# Patient Record
Sex: FEMALE | Race: WHITE | Employment: STUDENT | URBAN - METROPOLITAN AREA
[De-identification: names, ages, dates, MRNs, and addresses within clinical notes are randomized per-mention and may not be internally consistent; named-entity substitution may affect disease eponyms.]

---
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
## 2018-01-31 ENCOUNTER — HOSPITAL ENCOUNTER (OUTPATIENT)
Dept: LAB | Age: 18
Discharge: HOME | End: 2018-01-31

## 2018-01-31 ENCOUNTER — HOSPITAL ENCOUNTER (EMERGENCY)
Age: 18
Discharge: HOME | End: 2018-01-31
Attending: FAMILY MEDICINE

## 2018-01-31 VITALS
WEIGHT: 192 LBS | HEIGHT: 62 IN | RESPIRATION RATE: 20 BRPM | TEMPERATURE: 98.1 F | BODY MASS INDEX: 35.33 KG/M2 | OXYGEN SATURATION: 97 % | HEART RATE: 76 BPM | DIASTOLIC BLOOD PRESSURE: 64 MMHG | SYSTOLIC BLOOD PRESSURE: 122 MMHG

## 2018-01-31 DIAGNOSIS — J10.1 INFLUENZA A: Primary | ICD-10-CM

## 2018-01-31 LAB
INFLUENZA A AG (POC): ABNORMAL
INFLUENZA AG (POC) NEGATIVE CTRL.: ABNORMAL
INFLUENZA AG (POC) POSITIVE CTRL.: ABNORMAL
INFLUENZA B AG (POC): ABNORMAL
LOT NUMBER POC: ABNORMAL
S PYO AG THROAT QL: NEGATIVE

## 2018-01-31 PROCEDURE — 87070 CULTURE OTHR SPECIMN AEROBIC: CPT | Performed by: FAMILY MEDICINE

## 2018-01-31 RX ORDER — OSELTAMIVIR PHOSPHATE 75 MG/1
75 CAPSULE ORAL 2 TIMES DAILY
Qty: 10 CAP | Refills: 0 | Status: SHIPPED | OUTPATIENT
Start: 2018-01-31 | End: 2018-02-05

## 2018-01-31 RX ORDER — PREDNISONE 20 MG/1
20 TABLET ORAL 2 TIMES DAILY
Qty: 10 TAB | Refills: 0 | Status: SHIPPED | OUTPATIENT
Start: 2018-01-31 | End: 2018-02-05

## 2018-01-31 RX ORDER — BENZONATATE 200 MG/1
200 CAPSULE ORAL
Qty: 21 CAP | Refills: 0 | Status: SHIPPED | OUTPATIENT
Start: 2018-01-31 | End: 2018-02-07

## 2018-01-31 NOTE — UC PROVIDER NOTE
Patient is a 21 y.o. female presenting with cold symptoms. The history is provided by the patient. Cold Symptoms    This is a new problem. The current episode started more than 2 days ago. The problem has been gradually worsening. There has been no fever. Associated symptoms include congestion, rhinorrhea, sneezing, sore throat (with hoarseness of voice) and cough. Pertinent negatives include no chest pain. She has tried nothing for the symptoms. Past Medical History:   Diagnosis Date    Abdominal pain, other specified site 3/3/2015    Asthma     Premature infant         History reviewed. No pertinent surgical history. Family History   Problem Relation Age of Onset    Hypertension Father     Cancer Maternal Grandmother     Stroke Maternal Grandfather     Hypertension Paternal Grandmother     Hypertension Paternal Grandfather         Social History     Social History    Marital status: SINGLE     Spouse name: N/A    Number of children: N/A    Years of education: N/A     Occupational History    Not on file. Social History Main Topics    Smoking status: Never Smoker    Smokeless tobacco: Never Used    Alcohol use No    Drug use: No    Sexual activity: No     Other Topics Concern    Not on file     Social History Narrative                ALLERGIES: Review of patient's allergies indicates no known allergies. Review of Systems   HENT: Positive for congestion, rhinorrhea, sneezing and sore throat (with hoarseness of voice). Respiratory: Positive for cough. Cardiovascular: Negative for chest pain. All other systems reviewed and are negative. Vitals:    01/31/18 1701   BP: 122/64   Pulse: 76   Resp: 20   Temp: 98.1 °F (36.7 °C)   SpO2: 97%   Weight: 87.1 kg (192 lb)   Height: 5' 2\" (1.575 m)       Physical Exam   Constitutional: She appears ill. No distress.    HENT:   Right Ear: Tympanic membrane and ear canal normal.   Left Ear: Tympanic membrane and ear canal normal. Nose: Nose normal.   Mouth/Throat: No oropharyngeal exudate, posterior oropharyngeal edema or posterior oropharyngeal erythema. Eyes: Conjunctivae are normal. Right eye exhibits no discharge. Left eye exhibits no discharge. Neck: Neck supple. Pulmonary/Chest: Effort normal and breath sounds normal. No respiratory distress. She has no wheezes. She has no rales. Lymphadenopathy:     She has no cervical adenopathy. Skin: No rash noted. Nursing note and vitals reviewed. MDM     Differential Diagnosis; Clinical Impression; Plan:     CLINICAL IMPRESSION:  Influenza A  (primary encounter diagnosis)      DDX    Plan:    Fluids/ gargles  Claritin/ allegra   Tylenol cold-sinus - max strength 1-2 tab 4 times/ day    with Advil as needed    tamiflu and Tessalon- prednisone-   Amount and/or Complexity of Data Reviewed:   Clinical lab tests:  Ordered and reviewed   Review and summarize past medical records:  Yes  Risk of Significant Complications, Morbidity, and/or Mortality:   Presenting problems: Moderate  Diagnostic procedures: Moderate  Management options:   Moderate  Progress:   Patient progress:  Stable      Procedures

## 2018-01-31 NOTE — LETTER
[facility] 
[address] [address] 
[phone] Work/School Note Date: 1/31/2018 To Whom It May concern: 
 
Kelsie Kern was seen and treated today in the urgent care center by the following provider(s): 
Attending Provider: Luanne Aguillon MD.   
 
Kelsie Kern may return to work on 2/2 or 2/5/18 Wilner Deaner Sincerely, Luanne Aguillon MD

## 2018-01-31 NOTE — DISCHARGE INSTRUCTIONS

## 2018-02-01 ENCOUNTER — PATIENT OUTREACH (OUTPATIENT)
Dept: OTHER | Age: 18
End: 2018-02-01

## 2018-02-01 NOTE — PROGRESS NOTES
Initial BAILEE:   Patient on report as discharged from [address] [place] [address] Visit 1/31/18 for Influenza A. Initial attempt to contact patient for transitions of care.  Left discreet message on voicemail with this Care Coordinator's contact information.  Will attempt outreach on 2/5/18.          Fluids/ gargles, Claritin/ allegra   Tylenol cold-sinus - max strength 1-2 tab 4 times/ day  with Advil as needed      Get plenty of rest.  ·Drink plenty of fluids  Do not smoke. Smoking can make the flu worse. Breathe moist air from a hot shower or from a sink filled with hot water to help clear a stuffy nose. To ease coughing:  ¨Drink fluids to soothe a scratchy throat. ¨Suck on cough drops or plain hard candy. ¨Take an over-the-counter cough medicine that contains dextromethorphan to help you get  some sleep. Read and follow all instructions on the label. ¨Raise your head at night with an extra pillow. This may help you rest if coughing keeps you  awake. ·Take any prescribed medicine exactly as directed. Call 911 anytime you think you may need emergency care. For example, call if:  ·You have severe trouble breathing. Call your doctor now or seek immediate medical care if:  ·You have new or worse trouble breathing. ·You seem to be getting much sicker. ·You feel very sleepy or confused. ·You have a new or higher fever. ·You get a new rash. ·You begin to get better and then get worse. ·You are not getting better after 1 week.

## 2018-02-02 LAB
BACTERIA SPEC CULT: NORMAL
SERVICE CMNT-IMP: NORMAL

## 2018-02-05 ENCOUNTER — PATIENT OUTREACH (OUTPATIENT)
Dept: OTHER | Age: 18
End: 2018-02-05

## 2018-02-05 NOTE — LETTER
2/5/2018 4:45 PM 
 
Ms. Jocelin Rodrigues 19 [phone] Dear Jocelin Walters My name is Amanda Thomas, Employee Care Coordinator for [employer] and I have been trying to reach you. The Employee Care Management [facility]) program is a free-of-charge confidential service provided to our employees and their family members covered by the [facility]. The program will provide an employee and his/her family with the [employer] expertise to assist in navigating the health care delivery system, provider services, and their overall care needsso as to assure and improve health care interactions and enhance the quality of life. This program is designed to provide you with the opportunity to have a [employer] care manager partner with you for the following services: 
 
 1) when you come home from the hospital or emergency room 2) when help is needed to manage your disease 3) when you need assistance coordinating services or appointments [facility] now partners with [facility]. If you are a qualifying employee, you may receive an additional 10 wellness incentive points for every month of active participation with an Employee Care Manager. [employer] is dedicated to empowering the good health of its community and improving the quality of care and care experiences for employees and their families. We are committed to safeguarding patient confidentiality and privacy, assuring that every employee has the respect he or she deserves in managing their health. The information shared with your care manager will not be shared with anyone else aside from those you identify as part of your care team, and will only be used to assist you with any identified care needs. Please contact me if you would like this service provided to you. Sincerely, 
 
Lacy Ramirez LPN  [place] [facility] Care Coordinator [facility].  
 [address] [place] [address], [address] [place] [address] [address] [facility] [phone]  F [phone]

## 2018-02-05 NOTE — LETTER
2/5/2018 4:35 PM 
 
Ms. Demetris Rodrigues 19 [phone] Dear Demetris Aguayo My name is Art Pierson,  Employee Care Coordinator for [employer], and I have been trying to reach you. The Employee Care Management [facility]) program is a free-of-charge, confidential service provided to our employees and their family members covered by the [facility]. I can help you with care transitions such as when you come home from the hospital, when help is needed to manage your disease, or when you need assistance coordinating services or appointments. ECM now partners with [facility]. If you are a qualifying employee, you may receive an additional 10 wellness incentive points for every month of active participation with an Employee Care Manager. As healthcare providers, we know that patients do better when they have close follow up with a primary care provider (PCP). I can help you find one that is convenient to you and covered by your insurance. I can also help you understand any after visit instructions, such as what symptoms to watch out for, or any new or changed medications. We can work together using your preferred communication -- telephone, email, TradeSynchart. If you do not have a [employer] account, I can help you request access. Our program is designed to provide you with the opportunity to have a [employer] care manager partner with you for your healthcare needs. Due to not being able to reach you, I am closing out the current program, but will remain available to you should you have any questions. Please contact me at the below number if I can provide you with assistance for any of the above services. Sincerely, 
 
Niesha Taylor LPN  [facility] Care Coordinator [address] [place] [address] [address] [facility] [phone]  F [phone]

## 2018-02-05 NOTE — PROGRESS NOTES
Second attempt to reach patient for [facility] Program, and discharge assessment. Discreet VM left. Will send UTR letter via mail. Next outreach 2/19/18 f/u Flu.

## 2018-02-13 ENCOUNTER — HOSPITAL ENCOUNTER (EMERGENCY)
Age: 18
Discharge: HOME | End: 2018-02-13
Attending: FAMILY MEDICINE

## 2018-02-13 VITALS
SYSTOLIC BLOOD PRESSURE: 134 MMHG | TEMPERATURE: 97.8 F | WEIGHT: 193 LBS | HEART RATE: 89 BPM | BODY MASS INDEX: 35.51 KG/M2 | OXYGEN SATURATION: 98 % | HEIGHT: 62 IN | RESPIRATION RATE: 16 BRPM | DIASTOLIC BLOOD PRESSURE: 66 MMHG

## 2018-02-13 DIAGNOSIS — J20.9 ACUTE BRONCHITIS, UNSPECIFIED ORGANISM: ICD-10-CM

## 2018-02-13 DIAGNOSIS — H10.31 ACUTE BACTERIAL CONJUNCTIVITIS OF RIGHT EYE: ICD-10-CM

## 2018-02-13 DIAGNOSIS — J01.00 ACUTE NON-RECURRENT MAXILLARY SINUSITIS: Primary | ICD-10-CM

## 2018-02-13 RX ORDER — PREDNISONE 5 MG/1
TABLET ORAL
Qty: 21 TAB | Refills: 0 | Status: SHIPPED | OUTPATIENT
Start: 2018-02-13 | End: 2018-03-20

## 2018-02-13 RX ORDER — POLYMYXIN B SULFATE AND TRIMETHOPRIM 1; 10000 MG/ML; [USP'U]/ML
1 SOLUTION OPHTHALMIC EVERY 6 HOURS
Qty: 10 ML | Refills: 0 | Status: SHIPPED | OUTPATIENT
Start: 2018-02-13 | End: 2018-02-20

## 2018-02-13 RX ORDER — DOXYCYCLINE 100 MG/1
100 CAPSULE ORAL 2 TIMES DAILY
Qty: 20 CAP | Refills: 0 | Status: SHIPPED | OUTPATIENT
Start: 2018-02-13 | End: 2018-02-23

## 2018-02-14 NOTE — UC PROVIDER NOTE
Patient is a 21 y.o. female presenting with cough. The history is provided by the patient. Cough   This is a new problem. Episode onset: 2 weeks ago; dx'ed with flu prior to onset. The problem occurs every few minutes. The problem has been gradually worsening. The cough is productive of sputum. There has been no fever. Associated symptoms include eye redness (right x 1 day; does not wear contacts), rhinorrhea and wheezing. Pertinent negatives include no chest pain, no chills, no sweats, no ear congestion, no ear pain, no sore throat, no myalgias and no shortness of breath. She has tried nothing for the symptoms. Her past medical history is significant for asthma. Past Medical History:   Diagnosis Date    Abdominal pain, other specified site 3/3/2015    Asthma     Premature infant         History reviewed. No pertinent surgical history. Family History   Problem Relation Age of Onset    Hypertension Father     Cancer Maternal Grandmother     Stroke Maternal Grandfather     Hypertension Paternal Grandmother     Hypertension Paternal Grandfather         Social History     Social History    Marital status: SINGLE     Spouse name: N/A    Number of children: N/A    Years of education: N/A     Occupational History    Not on file. Social History Main Topics    Smoking status: Never Smoker    Smokeless tobacco: Never Used    Alcohol use No    Drug use: No    Sexual activity: No     Other Topics Concern    Not on file     Social History Narrative                ALLERGIES: Tamiflu [oseltamivir]    Review of Systems   Constitutional: Negative for chills and fever. HENT: Positive for congestion, rhinorrhea, sinus pain and sinus pressure. Negative for ear pain and sore throat. Eyes: Positive for discharge, redness (right x 1 day; does not wear contacts) and itching. Respiratory: Positive for cough and wheezing. Negative for shortness of breath.     Cardiovascular: Negative for chest pain and palpitations. Musculoskeletal: Negative for myalgias. Hematological: Positive for adenopathy. Vitals:    02/13/18 1901   BP: 134/66   Pulse: 89   Resp: 16   Temp: 97.8 °F (36.6 °C)   SpO2: 98%   Weight: 87.5 kg (193 lb)   Height: 5' 2\" (1.575 m)       Physical Exam   Constitutional: She appears well-developed and well-nourished. No distress. HENT:   Right Ear: Tympanic membrane, external ear and ear canal normal.   Left Ear: Tympanic membrane, external ear and ear canal normal.   Nose: Rhinorrhea present. Right sinus exhibits maxillary sinus tenderness and frontal sinus tenderness. Left sinus exhibits maxillary sinus tenderness and frontal sinus tenderness. Mouth/Throat: Oropharynx is clear and moist and mucous membranes are normal. No oropharyngeal exudate, posterior oropharyngeal edema, posterior oropharyngeal erythema or tonsillar abscesses. Eyes: EOM are normal. Pupils are equal, round, and reactive to light. Right eye exhibits exudate. Right conjunctiva is injected. Cardiovascular: Normal rate and normal heart sounds. Pulmonary/Chest: Effort normal. No respiratory distress. She has no decreased breath sounds. She has wheezes in the right upper field, the right lower field, the left upper field and the left lower field. She has no rhonchi. She has no rales. Lymphadenopathy:     She has cervical adenopathy. Neurological: She is alert. Skin: She is not diaphoretic. Psychiatric: She has a normal mood and affect. Her behavior is normal. Judgment and thought content normal.   Nursing note and vitals reviewed. MDM     Differential Diagnosis; Clinical Impression; Plan:     CLINICAL IMPRESSION:  Acute non-recurrent maxillary sinusitis  (primary encounter diagnosis)  Acute bronchitis, unspecified organism  Acute bacterial conjunctivitis of right eye    Plan:  1. Doxycycline, Pred christa  2. Polytrim  3.  PCP INI  Risk of Significant Complications, Morbidity, and/or Mortality: Presenting problems: Moderate  Management options:   Moderate  Progress:   Patient progress:  Stable      Procedures

## 2018-02-14 NOTE — DISCHARGE INSTRUCTIONS
Bronchitis: Care Instructions  Your Care Instructions    Bronchitis is inflammation of the bronchial tubes, which carry air to the lungs. The tubes swell and produce mucus, or phlegm. The mucus and inflamed bronchial tubes make you cough. You may have trouble breathing. Most cases of bronchitis are caused by viruses like those that cause colds. Antibiotics usually do not help and they may be harmful. Bronchitis usually develops rapidly and lasts about 2 to 3 weeks in otherwise healthy people. Follow-up care is a key part of your treatment and safety. Be sure to make and go to all appointments, and call your doctor if you are having problems. It's also a good idea to know your test results and keep a list of the medicines you take. How can you care for yourself at home? · Take all medicines exactly as prescribed. Call your doctor if you think you are having a problem with your medicine. · Get some extra rest.  · Take an over-the-counter pain medicine, such as acetaminophen (Tylenol), ibuprofen (Advil, Motrin), or naproxen (Aleve) to reduce fever and relieve body aches. Read and follow all instructions on the label. · Do not take two or more pain medicines at the same time unless the doctor told you to. Many pain medicines have acetaminophen, which is Tylenol. Too much acetaminophen (Tylenol) can be harmful. · Take an over-the-counter cough medicine that contains dextromethorphan to help quiet a dry, hacking cough so that you can sleep. Avoid cough medicines that have more than one active ingredient. Read and follow all instructions on the label. · Breathe moist air from a humidifier, hot shower, or sink filled with hot water. The heat and moisture will thin mucus so you can cough it out. · Do not smoke. Smoking can make bronchitis worse. If you need help quitting, talk to your doctor about stop-smoking programs and medicines. These can increase your chances of quitting for good.   When should you call for help? Call 911 anytime you think you may need emergency care. For example, call if:  ? · You have severe trouble breathing. ?Call your doctor now or seek immediate medical care if:  ? · You have new or worse trouble breathing. ? · You cough up dark brown or bloody mucus (sputum). ? · You have a new or higher fever. ? · You have a new rash. ? Watch closely for changes in your health, and be sure to contact your doctor if:  ? · You cough more deeply or more often, especially if you notice more mucus or a change in the color of your mucus. ? · You are not getting better as expected. Where can you learn more? Go to http://mason-rosario.info/. Enter H333 in the search box to learn more about \"Bronchitis: Care Instructions. \"  Current as of: May 12, 2017  Content Version: 11.4  © 8387-1614 [employer]. Care instructions adapted under license by [employer] (which disclaims liability or warranty for this information). If you have questions about a medical condition or this instruction, always ask your healthcare professional. [place] [address] any warranty or liability for your use of this information. Pinkeye: Care Instructions  Your Care Instructions    Pinkeye is redness and swelling of the eye surface and the conjunctiva (the lining of the eyelid and the covering of the white part of the eye). Pinkeye is also called conjunctivitis. Pinkeye is often caused by infection with bacteria or a virus. Dry air, allergies, smoke, and chemicals are other common causes. Pinkeye often clears on its own in 7 to 10 days. Antibiotics only help if the pinkeye is caused by bacteria. Pinkeye caused by infection spreads easily. If an allergy or chemical is causing pinkeye, it will not go away unless you can avoid whatever is causing it. Follow-up care is a key part of your treatment and safety.  Be sure to make and go to all appointments, and call your doctor if you are having problems. It's also a good idea to know your test results and keep a list of the medicines you take. How can you care for yourself at home? · Wash your hands often. Always wash them before and after you treat pinkeye or touch your eyes or face. · Use moist cotton or a clean, wet cloth to remove crust. Wipe from the inside corner of the eye to the outside. Use a clean part of the cloth for each wipe. · Put cold or warm wet cloths on your eye a few times a day if the eye hurts. · Do not wear contact lenses or eye makeup until the pinkeye is gone. Throw away any eye makeup you were using when you got pinkeye. Clean your contacts and storage case. If you wear disposable contacts, use a new pair when your eye has cleared and it is safe to wear contacts again. · If the doctor gave you antibiotic ointment or eyedrops, use them as directed. Use the medicine for as long as instructed, even if your eye starts looking better soon. Keep the bottle tip clean, and do not let it touch the eye area. · To put in eyedrops or ointment:  ¨ Tilt your head back, and pull your lower eyelid down with one finger. ¨ Drop or squirt the medicine inside the lower lid. ¨ Close your eye for 30 to 60 seconds to let the drops or ointment move around. ¨ Do not touch the ointment or dropper tip to your eyelashes or any other surface. · Do not share towels, pillows, or washcloths while you have pinkeye. When should you call for help? Call your doctor now or seek immediate medical care if:  ? · You have pain in your eye, not just irritation on the surface. ? · You have a change in vision or loss of vision. ? · You have an increase in discharge from the eye.   ? · Your eye has not started to improve or begins to get worse within 48 hours after you start using antibiotics. ? · Pinkeye lasts longer than 7 days. ? Watch closely for changes in your health, and be sure to contact your doctor if you have any problems. Where can you learn more? Go to http://mason-rosario.info/. Enter Y392 in the search box to learn more about \"Joe: Care Instructions. \"  Current as of: March 20, 2017  Content Version: 11.4  © 0719-0814 [employer]. Care instructions adapted under license by [employer] (which disclaims liability or warranty for this information). If you have questions about a medical condition or this instruction, always ask your healthcare professional. [address] any warranty or liability for your use of this information. Sinusitis: Care Instructions  Your Care Instructions    Sinusitis is an infection of the lining of the sinus cavities in your head. Sinusitis often follows a cold. It causes pain and pressure in your head and face. In most cases, sinusitis gets better on its own in 1 to 2 weeks. But some mild symptoms may last for several weeks. Sometimes antibiotics are needed. Follow-up care is a key part of your treatment and safety. Be sure to make and go to all appointments, and call your doctor if you are having problems. It's also a good idea to know your test results and keep a list of the medicines you take. How can you care for yourself at home? · Take an over-the-counter pain medicine, such as acetaminophen (Tylenol), ibuprofen (Advil, Motrin), or naproxen (Aleve). Read and follow all instructions on the label. · If the doctor prescribed antibiotics, take them as directed. Do not stop taking them just because you feel better. You need to take the full course of antibiotics. · Be careful when taking over-the-counter cold or flu medicines and Tylenol at the same time. Many of these medicines have acetaminophen, which is Tylenol. Read the labels to make sure that you are not taking more than the recommended dose. Too much acetaminophen (Tylenol) can be harmful.   · Breathe warm, moist air from a steamy shower, a hot bath, or a sink filled with hot water. Avoid cold, dry air. Using a humidifier in your home may help. Follow the directions for cleaning the machine. · Use saline (saltwater) nasal washes to help keep your nasal passages open and wash out mucus and bacteria. You can buy saline nose drops at a grocery store or drugstore. Or you can make your own at home by adding 1 teaspoon of salt and 1 teaspoon of baking soda to 2 cups of distilled water. If you make your own, fill a bulb syringe with the solution, insert the tip into your nostril, and squeeze gently. Maeve Camara your nose. · Put a hot, wet towel or a warm gel pack on your face 3 or 4 times a day for 5 to 10 minutes each time. · Try a decongestant nasal spray like oxymetazoline (Afrin). Do not use it for more than 3 days in a row. Using it for more than 3 days can make your congestion worse. When should you call for help? Call your doctor now or seek immediate medical care if:  ? · You have new or worse swelling or redness in your face or around your eyes. ? · You have a new or higher fever. ? Watch closely for changes in your health, and be sure to contact your doctor if:  ? · You have new or worse facial pain. ? · The mucus from your nose becomes thicker (like pus) or has new blood in it. ? · You are not getting better as expected. Where can you learn more? Go to http://mason-rosario.info/. Enter C587 in the search box to learn more about \"Sinusitis: Care Instructions. \"  Current as of: May 12, 2017  Content Version: 11.4  © 5069-2210 [employer]. Care instructions adapted under license by [employer] (which disclaims liability or warranty for this information). If you have questions about a medical condition or this instruction, always ask your healthcare professional. [address] any warranty or liability for your use of this information.

## 2018-02-19 ENCOUNTER — PATIENT OUTREACH (OUTPATIENT)
Dept: OTHER | Age: 18
End: 2018-02-19

## 2018-02-20 NOTE — PROGRESS NOTES
BAILEE f/u:  Telephone attempt to contact patient for transitions of care. Left discreet message on voicemail with this CC contact information. Will follow for one month for transitions of care needs. Next outreach is 3/5/18 for discussion Flu or Resolve Episode.

## 2018-03-05 ENCOUNTER — PATIENT OUTREACH (OUTPATIENT)
Dept: OTHER | Age: 18
End: 2018-03-05

## 2018-03-05 NOTE — LETTER
3/5/2018 3:52 PM 
 
Ms. Chelita Lopez [facility] 21 [phone] Dear Chelita Lopez My name is Goran Jacobson,  Employee Care Coordinator for [employer], and I have been trying to reach you. The Employee Care Management [facility]) program is a free-of-charge, confidential service provided to our employees and their family members covered by the [employer]. I can help you with care transitions such as when you come home from the hospital, when help is needed to manage your disease, or when you need assistance coordinating services or appointments. MAY now partners with [facility]. If you are a qualifying employee, you may receive an additional 10 wellness incentive points for every month of active participation with an Employee Care Manager. As healthcare providers, we know that patients do better when they have close follow up with a primary care provider (PCP). I can help you find one that is convenient to you and covered by your insurance. I can also help you understand any after visit instructions, such as what symptoms to watch out for, or any new or changed medications. We can work together using your preferred communication -- telephone, email, [employer]. If you do not have a [employer] account, I can help you request access. Our program is designed to provide you with the opportunity to have a [employer] care manager partner with you for your healthcare needs. Due to not being able to reach you, I am closing out the current program, but will remain available to you should you have any questions. Please contact me at the below number if I can provide you with assistance for any of the above services. Sincerely, 
 
Ann Guthrie LPN  [facility] Care Coordinator [address] [place] [address], [address] [place] [address] [address] [facility] [facility] [phone]  F [phone]

## 2018-03-05 NOTE — PROGRESS NOTES
Resolving current episode for case management due to patient unable to reach. Patient has not been reached after repeated calls and letters. Letter sent to patient notifying completion of services due to unable to reach. This writer's contact information and information regarding program services included in materials sent. *Looking at previous documentation - patient continues to use Urgent Care in place of making an appointment with PCP. Numerous calls and letters by ECM and PCP NN with no response.

## 2018-03-20 ENCOUNTER — OFFICE VISIT (OUTPATIENT)
Dept: URGENT CARE | Age: 18
End: 2018-03-20

## 2018-03-20 VITALS
OXYGEN SATURATION: 98 % | WEIGHT: 195 LBS | HEIGHT: 62 IN | RESPIRATION RATE: 18 BRPM | DIASTOLIC BLOOD PRESSURE: 85 MMHG | TEMPERATURE: 97.3 F | BODY MASS INDEX: 35.88 KG/M2 | SYSTOLIC BLOOD PRESSURE: 124 MMHG | HEART RATE: 82 BPM

## 2018-03-20 DIAGNOSIS — J40 BRONCHITIS: Primary | ICD-10-CM

## 2018-03-20 RX ORDER — AZITHROMYCIN 250 MG/1
TABLET, FILM COATED ORAL
Qty: 6 TAB | Refills: 0 | Status: SHIPPED | OUTPATIENT
Start: 2018-03-20 | End: 2018-06-03

## 2018-03-20 RX ORDER — PREDNISONE 10 MG/1
TABLET ORAL
Qty: 21 TAB | Refills: 0 | Status: SHIPPED | OUTPATIENT
Start: 2018-03-20 | End: 2018-06-03

## 2018-03-20 NOTE — LETTER
[address] [address] 
[phone] Work/School Note Date: 3/20/2018 To Whom It May concern: 
 
Arash Escoto was seen and treated today in the urgent care center by the following provider(s): 
No providers found. Arash Escoto may return to work on 3/21/2018. Sincerely, Valentina Muse MD

## 2018-03-20 NOTE — PATIENT INSTRUCTIONS
Bronchitis: Care Instructions  Your Care Instructions    Bronchitis is inflammation of the bronchial tubes, which carry air to the lungs. The tubes swell and produce mucus, or phlegm. The mucus and inflamed bronchial tubes make you cough. You may have trouble breathing. Most cases of bronchitis are caused by viruses like those that cause colds. Antibiotics usually do not help and they may be harmful. Bronchitis usually develops rapidly and lasts about 2 to 3 weeks in otherwise healthy people. Follow-up care is a key part of your treatment and safety. Be sure to make and go to all appointments, and call your doctor if you are having problems. It's also a good idea to know your test results and keep a list of the medicines you take. How can you care for yourself at home? · Take all medicines exactly as prescribed. Call your doctor if you think you are having a problem with your medicine. · Get some extra rest.  · Take an over-the-counter pain medicine, such as acetaminophen (Tylenol), ibuprofen (Advil, Motrin), or naproxen (Aleve) to reduce fever and relieve body aches. Read and follow all instructions on the label. · Do not take two or more pain medicines at the same time unless the doctor told you to. Many pain medicines have acetaminophen, which is Tylenol. Too much acetaminophen (Tylenol) can be harmful. · Take an over-the-counter cough medicine that contains dextromethorphan to help quiet a dry, hacking cough so that you can sleep. Avoid cough medicines that have more than one active ingredient. Read and follow all instructions on the label. · Breathe moist air from a humidifier, hot shower, or sink filled with hot water. The heat and moisture will thin mucus so you can cough it out. · Do not smoke. Smoking can make bronchitis worse. If you need help quitting, talk to your doctor about stop-smoking programs and medicines. These can increase your chances of quitting for good.   When should you call for help? Call 911 anytime you think you may need emergency care. For example, call if:  ? · You have severe trouble breathing. ?Call your doctor now or seek immediate medical care if:  ? · You have new or worse trouble breathing. ? · You cough up dark brown or bloody mucus (sputum). ? · You have a new or higher fever. ? · You have a new rash. ? Watch closely for changes in your health, and be sure to contact your doctor if:  ? · You cough more deeply or more often, especially if you notice more mucus or a change in the color of your mucus. ? · You are not getting better as expected. Where can you learn more? Go to http://mason-rosario.info/. Enter H333 in the search box to learn more about \"Bronchitis: Care Instructions. \"  Current as of: May 12, 2017  Content Version: 11.4  © 2455-8956 [employer]. Care instructions adapted under license by [employer] (which disclaims liability or warranty for this information). If you have questions about a medical condition or this instruction, always ask your healthcare professional. [address] any warranty or liability for your use of this information.

## 2018-03-20 NOTE — MR AVS SNAPSHOT
Chelly [address] [place] [id number] 
[phone] Patient: Calderon Perkins MRN: [id number] LI:[birth year] Visit Information Date & Time Provider Department Dept. Phone Encounter #  
 3/20/2018  9:15 AM [address] [facility] [phone] [id number] Upcoming Health Maintenance Date Due Hepatitis A Peds Age 1-18 (1 of 2 - Standard Series) 5/29/1999 DTaP/Tdap/Td series (1 - Tdap) 5/29/2005 HPV AGE 9Y-26Y (1 of 3 - Female 3 Dose Series) 5/29/2009 Influenza Age 5 to Adult 8/1/2017 Allergies as of 3/20/2018  Review Complete On: 3/20/2018 By: Rivera Ng RN Severity Noted Reaction Type Reactions Tamiflu [Oseltamivir]  02/13/2018    Hives Current Immunizations  Never Reviewed No immunizations on file. Not reviewed this visit You Were Diagnosed With   
  
 Codes Comments Bronchitis    -  Primary ICD-10-CM: S36 ICD-9-CM: 166 Vitals BP Pulse Temp Resp Height(growth percentile) Weight(growth percentile) 124/85 (95 %/ 98 %)* 82 97.3 °F (36.3 °C) 18 5' 2\" (1.575 m) (18 %, Z= -0.90) 195 lb (88.5 kg) (97 %, Z= 1.86) LMP SpO2 BMI OB Status Smoking Status 03/16/2018 98% 35.67 kg/m2 (97 %, Z= 1.93) Having regular periods Never Smoker *BP percentiles are based on NHBPEP's 4th Report Growth percentiles are based on CDC 2-20 Years data. BMI and BSA Data Body Mass Index Body Surface Area  
 35.67 kg/m 2 1.97 m 2 Preferred Pharmacy Pharmacy Name Phone [id number] [phone] Your Updated Medication List  
  
   
This list is accurate as of 3/20/18  9:44 AM.  Always use your most recent med list.  
  
  
  
  
 azithromycin 250 mg tablet Commonly known as:  Niesha Sick Take two tablets today then one tablet daily  
  
 predniSONE 10 mg dose pack Commonly known as:  STERAPRED DS Take as directed for 6 days, with food Prescriptions Sent to Pharmacy Refills  
 azithromycin (ZITHROMAX) 250 mg tablet 0 Sig: Take two tablets today then one tablet daily Class: Normal  
 Pharmacy: [facility] [address] [place] [address] Ph #: [phone]  
 predniSONE (STERAPRED DS) 10 mg dose pack 0 Sig: Take as directed for 6 days, with food Class: Normal  
 Pharmacy: [address] [place] [address] Ph #: [phone] Patient Instructions Bronchitis: Care Instructions Your Care Instructions Bronchitis is inflammation of the bronchial tubes, which carry air to the lungs. The tubes swell and produce mucus, or phlegm. The mucus and inflamed bronchial tubes make you cough. You may have trouble breathing. Most cases of bronchitis are caused by viruses like those that cause colds. Antibiotics usually do not help and they may be harmful. Bronchitis usually develops rapidly and lasts about 2 to 3 weeks in otherwise healthy people. Follow-up care is a key part of your treatment and safety. Be sure to make and go to all appointments, and call your doctor if you are having problems. It's also a good idea to know your test results and keep a list of the medicines you take. How can you care for yourself at home? · Take all medicines exactly as prescribed. Call your doctor if you think you are having a problem with your medicine. · Get some extra rest. 
· Take an over-the-counter pain medicine, such as acetaminophen (Tylenol), ibuprofen (Advil, Motrin), or naproxen (Aleve) to reduce fever and relieve body aches. Read and follow all instructions on the label. · Do not take two or more pain medicines at the same time unless the doctor told you to. Many pain medicines have acetaminophen, which is Tylenol. Too much acetaminophen (Tylenol) can be harmful. · Take an over-the-counter cough medicine that contains dextromethorphan to help quiet a dry, hacking cough so that you can sleep. Avoid cough medicines that have more than one active ingredient. Read and follow all instructions on the label. · Breathe moist air from a humidifier, hot shower, or sink filled with hot water. The heat and moisture will thin mucus so you can cough it out. · Do not smoke. Smoking can make bronchitis worse. If you need help quitting, talk to your doctor about stop-smoking programs and medicines. These can increase your chances of quitting for good. When should you call for help? Call 911 anytime you think you may need emergency care. For example, call if: 
? · You have severe trouble breathing. ?Call your doctor now or seek immediate medical care if: 
? · You have new or worse trouble breathing. ? · You cough up dark brown or bloody mucus (sputum). ? · You have a new or higher fever. ? · You have a new rash. ? Watch closely for changes in your health, and be sure to contact your doctor if: 
? · You cough more deeply or more often, especially if you notice more mucus or a change in the color of your mucus. ? · You are not getting better as expected. Where can you learn more? Go to http://mason-rosario.info/. Enter H333 in the search box to learn more about \"Bronchitis: Care Instructions. \" Current as of: May 12, 2017 Content Version: 11.4 © 0429-2459 [employer]. Care instructions adapted under license by [employer] (which disclaims liability or warranty for this information). If you have questions about a medical condition or this instruction, always ask your healthcare professional. [address] any warranty or liability for your use of this information. Introducing \[facility]\"" & HEALTH SERVICES!    
 Hayden Prado introduces Where[employer] patient portal. Now you can access parts of your medical record, email your doctor's office, and request medication refills online. 1. In your internet browser, go to [email or web address] 2. Click on the First Time User? Click Here link in the Sign In box. You will see the New Member Sign Up page. 3. Enter your Glider Access Code exactly as it appears below. You will not need to use this code after youve completed the sign-up process. If you do not sign up before the expiration date, you must request a new code. · Glider Access Code: [email or web address] Expires: 5/1/2018  6:39 PM 
 
4. Enter the last four digits of your Social Security Number (xxxx) and Date of Birth (mm/dd/yyyy) as indicated and click Submit. You will be taken to the next sign-up page. 5. Create a Glider ID. This will be your Glider login ID and cannot be changed, so think of one that is secure and easy to remember. 6. Create a Glider password. You can change your password at any time. 7. Enter your Password Reset Question and Answer. This can be used at a later time if you forget your password. 8. Enter your e-mail address. You will receive e-mail notification when new information is available in [address] 9. Click Sign Up. You can now view and download portions of your medical record. 10. Click the Download Summary menu link to download a portable copy of your medical information. If you have questions, please visit the Frequently Asked Questions section of the Glider website. Remember, Glider is NOT to be used for urgent needs. For medical emergencies, dial 911. Now available from your iPhone and Android! Please provide this summary of care documentation to your next provider. Your primary care clinician is listed as Yaima Rivero. If you have any questions after today's visit, please call [phone].

## 2018-03-20 NOTE — PROGRESS NOTES
Patient is a 21 y.o. female presenting with cold symptoms. The history is provided by the patient. Cold Symptoms   The history is provided by the patient. This is a recurrent problem. Episode onset: 3 days ago. The problem has been gradually worsening. The cough is productive of sputum. There has been no fever. Associated symptoms include rhinorrhea, shortness of breath and wheezing. Pertinent negatives include no chest pain, no chills, no sweats, no ear congestion, no ear pain, no headaches, no sore throat, no myalgias, no nausea and no vomiting. She has tried inhalers for the symptoms. The treatment provided no relief. Her past medical history is significant for asthma. Past Medical History:   Diagnosis Date    Abdominal pain, other specified site 3/3/2015    Asthma     Premature infant         History reviewed. No pertinent surgical history. Family History   Problem Relation Age of Onset    Hypertension Father     Cancer Maternal Grandmother     Stroke Maternal Grandfather     Hypertension Paternal Grandmother     Hypertension Paternal Grandfather         Social History     Social History    Marital status: SINGLE     Spouse name: N/A    Number of children: N/A    Years of education: N/A     Occupational History    Not on file. Social History Main Topics    Smoking status: Never Smoker    Smokeless tobacco: Never Used    Alcohol use No    Drug use: No    Sexual activity: No     Other Topics Concern    Not on file     Social History Narrative                ALLERGIES: Tamiflu [oseltamivir]    Review of Systems   Constitutional: Negative for activity change, appetite change, chills and fever. HENT: Positive for congestion and rhinorrhea. Negative for ear pain, sinus pain, sinus pressure, sore throat and trouble swallowing. Respiratory: Positive for chest tightness, shortness of breath and wheezing. Negative for cough.     Cardiovascular: Negative for chest pain and palpitations. Gastrointestinal: Negative for nausea and vomiting. Musculoskeletal: Negative for myalgias. Neurological: Negative for dizziness and headaches. Hematological: Negative for adenopathy. Vitals:    03/20/18 0935   BP: 124/85   Pulse: 82   Resp: 18   Temp: 97.3 °F (36.3 °C)   SpO2: 98%   Weight: 195 lb (88.5 kg)   Height: 5' 2\" (1.575 m)       Physical Exam   Constitutional: She appears well-developed and well-nourished. No distress. HENT:   Right Ear: Tympanic membrane, external ear and ear canal normal.   Left Ear: Tympanic membrane, external ear and ear canal normal.   Nose: Rhinorrhea present. Right sinus exhibits maxillary sinus tenderness and frontal sinus tenderness. Left sinus exhibits maxillary sinus tenderness and frontal sinus tenderness. Mouth/Throat: Mucous membranes are normal. Posterior oropharyngeal erythema present. No oropharyngeal exudate, posterior oropharyngeal edema or tonsillar abscesses. Cardiovascular: Normal rate, regular rhythm and normal heart sounds. Pulmonary/Chest: Effort normal. No respiratory distress. She has wheezes in the right upper field, the right lower field and the left lower field. She has no rales. Lymphadenopathy:     She has no cervical adenopathy. Neurological: She is alert. Skin: She is not diaphoretic. Psychiatric: She has a normal mood and affect. Her behavior is normal. Judgment and thought content normal.   Nursing note and vitals reviewed. MDM    Procedures        ICD-10-CM ICD-9-CM    1. Bronchitis J40 490      Medications Ordered Today   Medications    azithromycin (ZITHROMAX) 250 mg tablet     Sig: Take two tablets today then one tablet daily     Dispense:  6 Tab     Refill:  0    predniSONE (STERAPRED DS) 10 mg dose pack     Sig: Take as directed for 6 days, with food     Dispense:  21 Tab     Refill:  0     The patients condition was discussed with the patient and they understand.   The patient is to follow up with primary care doctor ,If signs and symptoms become worse the pt is to go to the ER. The patient is to take medications as prescribed.

## 2018-06-03 ENCOUNTER — OFFICE VISIT (OUTPATIENT)
Dept: URGENT CARE | Age: 18
End: 2018-06-03

## 2018-06-03 VITALS
WEIGHT: 195 LBS | SYSTOLIC BLOOD PRESSURE: 141 MMHG | TEMPERATURE: 97.8 F | RESPIRATION RATE: 18 BRPM | OXYGEN SATURATION: 96 % | HEART RATE: 70 BPM | HEIGHT: 62 IN | BODY MASS INDEX: 35.88 KG/M2 | DIASTOLIC BLOOD PRESSURE: 79 MMHG

## 2018-06-03 DIAGNOSIS — J40 BRONCHITIS: Primary | ICD-10-CM

## 2018-06-03 DIAGNOSIS — J32.9 SINUSITIS, UNSPECIFIED CHRONICITY, UNSPECIFIED LOCATION: ICD-10-CM

## 2018-06-03 DIAGNOSIS — H61.23 BILATERAL IMPACTED CERUMEN: ICD-10-CM

## 2018-06-03 DIAGNOSIS — H60.509 ACUTE OTITIS EXTERNA, UNSPECIFIED LATERALITY, UNSPECIFIED TYPE: ICD-10-CM

## 2018-06-03 RX ORDER — AZITHROMYCIN 250 MG/1
TABLET, FILM COATED ORAL
Qty: 6 TAB | Refills: 0 | Status: SHIPPED | OUTPATIENT
Start: 2018-06-03 | End: 2018-08-07

## 2018-06-03 RX ORDER — MONTELUKAST SODIUM 10 MG/1
10 TABLET ORAL DAILY
COMMUNITY

## 2018-06-03 RX ORDER — PREDNISONE 5 MG/1
TABLET ORAL
Qty: 21 TAB | Refills: 0 | Status: SHIPPED | OUTPATIENT
Start: 2018-06-03 | End: 2018-08-07

## 2018-06-03 RX ORDER — OFLOXACIN 3 MG/ML
10 SOLUTION AURICULAR (OTIC) DAILY
Qty: 5 ML | Refills: 0 | Status: SHIPPED | OUTPATIENT
Start: 2018-06-03 | End: 2018-06-10

## 2018-06-03 RX ORDER — ALBUTEROL SULFATE 90 UG/1
2 AEROSOL, METERED RESPIRATORY (INHALATION)
Qty: 1 INHALER | Refills: 0 | Status: SHIPPED | OUTPATIENT
Start: 2018-06-03 | End: 2019-03-22

## 2018-06-03 NOTE — LETTER
[address] [address] 
[phone] Work/School Note Date: 6/3/2018 To Whom It May concern: 
 
Fabienne Jordan was seen and treated today in the urgent care center. Fabienne Jordan may return to school on 6/5/2018. Sincerely, Edmundo Hardy MD

## 2018-06-03 NOTE — MR AVS SNAPSHOT
[place] [address] 
[phone] Patient: Anu Austin MRN: [id number] [id number] Visit Information Date & Time Provider Department Dept. Phone Encounter #  
 6/3/2018 11:30 AM [facility] [phone] [id number] Upcoming Health Maintenance Date Due Hepatitis A Peds Age 1-18 (1 of 2 - Standard Series) 5/29/1999 DTaP/Tdap/Td series (1 - Tdap) 5/29/2005 HPV Age 9Y-34Y (1 of 3 - Female 3 Dose Series) 5/29/2009 Influenza Age 5 to Adult 8/1/2018 Allergies as of 6/3/2018  Review Complete On: 6/3/2018 By: Moy Rooney MD  
  
 Severity Noted Reaction Type Reactions Tamiflu [Oseltamivir]  02/13/2018    Hives Current Immunizations  Never Reviewed No immunizations on file. Not reviewed this visit You Were Diagnosed With   
  
 Codes Comments Bronchitis    -  Primary ICD-10-CM: Q38 ICD-9-CM: 315 Sinusitis, unspecified chronicity, unspecified location     ICD-10-CM: J32.9 ICD-9-CM: 473.9 Bilateral impacted cerumen     ICD-10-CM: H61.23 
ICD-9-CM: 380.4 Vitals BP Pulse Temp Resp Height(growth percentile) Weight(growth percentile) 141/79 70 97.8 °F (36.6 °C) 18 5' 2\" (1.575 m) 195 lb (88.5 kg) SpO2 BMI OB Status Smoking Status 96% 35.67 kg/m2 Having regular periods Never Smoker BMI and BSA Data Body Mass Index Body Surface Area  
 35.67 kg/m 2 1.97 m 2 Preferred Pharmacy Pharmacy Name Phone [id number] [place], [address] [facility] [phone] Your Updated Medication List  
  
   
This list is accurate as of 6/3/18 12:42 PM.  Always use your most recent med list.  
  
  
  
  
 albuterol 90 mcg/actuation inhaler Commonly known as:  PROVENTIL HFA, VENTOLIN HFA, PROAIR HFA Take 2 Puffs by inhalation every four (4) hours as needed for Wheezing. azithromycin 250 mg tablet Commonly known as:  Dianelys Gaona Take two tablets today then one tablet daily  
  
 predniSONE 5 mg dose pack Commonly known as:  STERAPRED See administration instruction per 5mg dose pack SINGULAIR 10 mg tablet Generic drug:  montelukast  
Take 10 mg by mouth daily. Prescriptions Sent to Pharmacy Refills  
 predniSONE (STERAPRED) 5 mg dose pack 0 Sig: See administration instruction per 5mg dose pack Class: Normal  
 Pharmacy: [facility]/pharmacy [address] [place] [address] Ph #: [phone]  
 azithromycin (ZITHROMAX) 250 mg tablet 0 Sig: Take two tablets today then one tablet daily Class: Normal  
 Pharmacy: [facility]/pharmacy #[id number] Ph #: [phone]  
 albuterol (PROVENTIL HFA, VENTOLIN HFA, PROAIR HFA) 90 mcg/actuation inhaler 0 Sig: Take 2 Puffs by inhalation every four (4) hours as needed for Wheezing. Class: Normal  
 Pharmacy: [address] [place] [address] Ph #: [phone] Route: Inhalation We Performed the Following REMOVE IMPACTED EAR WAX [83512 CPT(R)] Patient Instructions Bronchitis: Care Instructions Your Care Instructions Bronchitis is inflammation of the bronchial tubes, which carry air to the lungs. The tubes swell and produce mucus, or phlegm. The mucus and inflamed bronchial tubes make you cough. You may have trouble breathing. Most cases of bronchitis are caused by viruses like those that cause colds. Antibiotics usually do not help and they may be harmful. Bronchitis usually develops rapidly and lasts about 2 to 3 weeks in otherwise healthy people. Follow-up care is a key part of your treatment and safety. Be sure to make and go to all appointments, and call your doctor if you are having problems. It's also a good idea to know your test results and keep a list of the medicines you take. How can you care for yourself at home? · Take all medicines exactly as prescribed. Call your doctor if you think you are having a problem with your medicine. · Get some extra rest. 
· Take an over-the-counter pain medicine, such as acetaminophen (Tylenol), ibuprofen (Advil, Motrin), or naproxen (Aleve) to reduce fever and relieve body aches. Read and follow all instructions on the label. · Do not take two or more pain medicines at the same time unless the doctor told you to. Many pain medicines have acetaminophen, which is Tylenol. Too much acetaminophen (Tylenol) can be harmful. · Take an over-the-counter cough medicine that contains dextromethorphan to help quiet a dry, hacking cough so that you can sleep. Avoid cough medicines that have more than one active ingredient. Read and follow all instructions on the label. · Breathe moist air from a humidifier, hot shower, or sink filled with hot water. The heat and moisture will thin mucus so you can cough it out. · Do not smoke. Smoking can make bronchitis worse. If you need help quitting, talk to your doctor about stop-smoking programs and medicines. These can increase your chances of quitting for good. When should you call for help? Call 911 anytime you think you may need emergency care. For example, call if: 
? · You have severe trouble breathing. ?Call your doctor now or seek immediate medical care if: 
? · You have new or worse trouble breathing. ? · You cough up dark brown or bloody mucus (sputum). ? · You have a new or higher fever. ? · You have a new rash. ? Watch closely for changes in your health, and be sure to contact your doctor if: 
? · You cough more deeply or more often, especially if you notice more mucus or a change in the color of your mucus. ? · You are not getting better as expected. Where can you learn more? Go to http://mason-rosario.info/. Enter H333 in the search box to learn more about \"Bronchitis: Care Instructions. \" Current as of: May 12, 2017 Content Version: 11.4 © 8326-5467 [email or web address]. Care instructions adapted under license by [employer] (which disclaims liability or warranty for this information). If you have questions about a medical condition or this instruction, always ask your healthcare professional. [address] any warranty or liability for your use of this information. Earwax Blockage: Care Instructions Your Care Instructions Earwax is a natural substance that protects the ear canal. Normally, earwax drains from the ears and does not cause problems. Sometimes earwax builds up and hardens. Earwax blockage (also called cerumen impaction) can cause some loss of hearing and pain. When wax is tightly packed, you will need to have your doctor remove it. Follow-up care is a key part of your treatment and safety. Be sure to make and go to all appointments, and call your doctor if you are having problems. It's also a good idea to know your test results and keep a list of the medicines you take. How can you care for yourself at home? · Do not try to remove earwax with cotton swabs, fingers, or other objects. This can make the blockage worse and damage the eardrum. · If your doctor recommends that you try to remove earwax at home: ¨ Soften and loosen the earwax with warm mineral oil. You also can try hydrogen peroxide mixed with an equal amount of room temperature water. Place 2 drops of the fluid, warmed to body temperature, in the ear two times a day for up to 5 days. ¨ Once the wax is loose and soft, all that is usually needed to remove it from the ear canal is a gentle, warm shower. Direct the water into the ear, then tip your head to let the earwax drain out. Dry your ear thoroughly with a hair dryer set on low. Hold the dryer several inches from your ear. ¨ If the warm mineral oil and shower do not work, use an over-the-counter wax softener followed by gentle flushing with an ear syringe each night for a week or two. Make sure the flushing solution is body temperature. Cool or hot fluids in the ear can cause dizziness. When should you call for help? Call your doctor now or seek immediate medical care if: 
? · Pus or blood drains from your ear. ? · Your ears are ringing or feel full. ? · You have a loss of hearing. ? Watch closely for changes in your health, and be sure to contact your doctor if: 
? · You have pain or reduced hearing after 1 week of home treatment. ? · You have any new symptoms, such as nausea or balance problems. Where can you learn more? Go to http://mason-rosario.info/. Enter J539 in the search box to learn more about \"Earwax Blockage: Care Instructions. \" Current as of: March 20, 2017 Content Version: 11.4 © 2066-9985 [employer]. Care instructions adapted under license by [employer]oond[employer] (which disclaims liability or warranty for this information). If you have questions about a medical condition or this instruction, always ask your healthcare professional. [place] [address] any warranty or liability for your use of this information. Sinusitis: Care Instructions Your Care Instructions Sinusitis is an infection of the lining of the sinus cavities in your head. Sinusitis often follows a cold. It causes pain and pressure in your head and face. In most cases, sinusitis gets better on its own in 1 to 2 weeks. But some mild symptoms may last for several weeks. Sometimes antibiotics are needed. Follow-up care is a key part of your treatment and safety. Be sure to make and go to all appointments, and call your doctor if you are having problems. It's also a good idea to know your test results and keep a list of the medicines you take. How can you care for yourself at home? · Take an over-the-counter pain medicine, such as acetaminophen (Tylenol), ibuprofen (Advil, Motrin), or naproxen (Aleve). Read and follow all instructions on the label. · If the doctor prescribed antibiotics, take them as directed. Do not stop taking them just because you feel better. You need to take the full course of antibiotics. · Be careful when taking over-the-counter cold or flu medicines and Tylenol at the same time. Many of these medicines have acetaminophen, which is Tylenol. Read the labels to make sure that you are not taking more than the recommended dose. Too much acetaminophen (Tylenol) can be harmful. · Breathe warm, moist air from a steamy shower, a hot bath, or a sink filled with hot water. Avoid cold, dry air. Using a humidifier in your home may help. Follow the directions for cleaning the machine. · Use saline (saltwater) nasal washes to help keep your nasal passages open and wash out mucus and bacteria. You can buy saline nose drops at a grocery store or drugstore. Or you can make your own at home by adding 1 teaspoon of salt and 1 teaspoon of baking soda to 2 cups of distilled water. If you make your own, fill a bulb syringe with the solution, insert the tip into your nostril, and squeeze gently. Rhina Stager your nose. · Put a hot, wet towel or a warm gel pack on your face 3 or 4 times a day for 5 to 10 minutes each time. · Try a decongestant nasal spray like oxymetazoline (Afrin). Do not use it for more than 3 days in a row. Using it for more than 3 days can make your congestion worse. When should you call for help? Call your doctor now or seek immediate medical care if: 
? · You have new or worse swelling or redness in your face or around your eyes. ? · You have a new or higher fever. ? Watch closely for changes in your health, and be sure to contact your doctor if: 
? · You have new or worse facial pain. ? · The mucus from your nose becomes thicker (like pus) or has new blood in it. ? · You are not getting better as expected. Where can you learn more? Go to http://mason-rosario.info/. Enter D396 in the search box to learn more about \"Sinusitis: Care Instructions. \" Current as of: May 12, 2017 Content Version: 11.4 © 0444-8638 Watt[employer]. Care instructions adapted under license by StartX (which disclaims liability or warranty for this information). If you have questions about a medical condition or this instruction, always ask your healthcare professional. [address] any warranty or liability for your use of this information. Introducing [facility]! [employer] introduces [employer] patient portal. Now you can access parts of your medical record, email your doctor's office, and request medication refills online. 1. In your internet browser, go to [email or web address] 2. Click on the First Time User? Click Here link in the Sign In box. You will see the New Member Sign Up page. 3. Enter your [employer] Access Code exactly as it appears below. You will not need to use this code after youve completed the sign-up process. If you do not sign up before the expiration date, you must request a new code. · [employer] Access Code: [email or web address] Expires: 9/1/2018 12:42 PM 
 
4. Enter the last four digits of your Social Security Number (xxxx) and Date of Birth (mm/dd/yyyy) as indicated and click Submit. You will be taken to the next sign-up page. 5. Create a Toperat ID. This will be your [employer] login ID and cannot be changed, so think of one that is secure and easy to remember. 6. Create a [employer] password. You can change your password at any time. 7. Enter your Password Reset Question and Answer. This can be used at a later time if you forget your password. 8. Enter your e-mail address. You will receive e-mail notification when new information is available in [address] 9. Click Sign Up. You can now view and download portions of your medical record. 10. Click the Download Summary menu link to download a portable copy of your medical information. If you have questions, please visit the Frequently Asked Questions section of the Mutual[employer] website. Remember, [employer] is NOT to be used for urgent needs. For medical emergencies, dial 911. Now available from your iPhone and Android! Please provide this summary of care documentation to your next provider. Your primary care clinician is listed as Rios Elliott. If you have any questions after today's visit, please call [phone].

## 2018-06-03 NOTE — PATIENT INSTRUCTIONS
Bronchitis: Care Instructions  Your Care Instructions    Bronchitis is inflammation of the bronchial tubes, which carry air to the lungs. The tubes swell and produce mucus, or phlegm. The mucus and inflamed bronchial tubes make you cough. You may have trouble breathing. Most cases of bronchitis are caused by viruses like those that cause colds. Antibiotics usually do not help and they may be harmful. Bronchitis usually develops rapidly and lasts about 2 to 3 weeks in otherwise healthy people. Follow-up care is a key part of your treatment and safety. Be sure to make and go to all appointments, and call your doctor if you are having problems. It's also a good idea to know your test results and keep a list of the medicines you take. How can you care for yourself at home? · Take all medicines exactly as prescribed. Call your doctor if you think you are having a problem with your medicine. · Get some extra rest.  · Take an over-the-counter pain medicine, such as acetaminophen (Tylenol), ibuprofen (Advil, Motrin), or naproxen (Aleve) to reduce fever and relieve body aches. Read and follow all instructions on the label. · Do not take two or more pain medicines at the same time unless the doctor told you to. Many pain medicines have acetaminophen, which is Tylenol. Too much acetaminophen (Tylenol) can be harmful. · Take an over-the-counter cough medicine that contains dextromethorphan to help quiet a dry, hacking cough so that you can sleep. Avoid cough medicines that have more than one active ingredient. Read and follow all instructions on the label. · Breathe moist air from a humidifier, hot shower, or sink filled with hot water. The heat and moisture will thin mucus so you can cough it out. · Do not smoke. Smoking can make bronchitis worse. If you need help quitting, talk to your doctor about stop-smoking programs and medicines. These can increase your chances of quitting for good.   When should you call for help? Call 911 anytime you think you may need emergency care. For example, call if:  ? · You have severe trouble breathing. ?Call your doctor now or seek immediate medical care if:  ? · You have new or worse trouble breathing. ? · You cough up dark brown or bloody mucus (sputum). ? · You have a new or higher fever. ? · You have a new rash. ? Watch closely for changes in your health, and be sure to contact your doctor if:  ? · You cough more deeply or more often, especially if you notice more mucus or a change in the color of your mucus. ? · You are not getting better as expected. Where can you learn more? Go to http://mason-rosario.info/. Enter H333 in the search box to learn more about \"Bronchitis: Care Instructions. \"  Current as of: May 12, 2017  Content Version: 11.4  © 6588-0900 [employer]. Care instructions adapted under license by GelSight (which disclaims liability or warranty for this information). If you have questions about a medical condition or this instruction, always ask your healthcare professional. [place] [address] any warranty or liability for your use of this information. Earwax Blockage: Care Instructions  Your Care Instructions    Earwax is a natural substance that protects the ear canal. Normally, earwax drains from the ears and does not cause problems. Sometimes earwax builds up and hardens. Earwax blockage (also called cerumen impaction) can cause some loss of hearing and pain. When wax is tightly packed, you will need to have your doctor remove it. Follow-up care is a key part of your treatment and safety. Be sure to make and go to all appointments, and call your doctor if you are having problems. It's also a good idea to know your test results and keep a list of the medicines you take. How can you care for yourself at home? · Do not try to remove earwax with cotton swabs, fingers, or other objects. This can make the blockage worse and damage the eardrum. · If your doctor recommends that you try to remove earwax at home:  ¨ Soften and loosen the earwax with warm mineral oil. You also can try hydrogen peroxide mixed with an equal amount of room temperature water. Place 2 drops of the fluid, warmed to body temperature, in the ear two times a day for up to 5 days. ¨ Once the wax is loose and soft, all that is usually needed to remove it from the ear canal is a gentle, warm shower. Direct the water into the ear, then tip your head to let the earwax drain out. Dry your ear thoroughly with a hair dryer set on low. Hold the dryer several inches from your ear. ¨ If the warm mineral oil and shower do not work, use an over-the-counter wax softener followed by gentle flushing with an ear syringe each night for a week or two. Make sure the flushing solution is body temperature. Cool or hot fluids in the ear can cause dizziness. When should you call for help? Call your doctor now or seek immediate medical care if:  ? · Pus or blood drains from your ear. ? · Your ears are ringing or feel full. ? · You have a loss of hearing. ? Watch closely for changes in your health, and be sure to contact your doctor if:  ? · You have pain or reduced hearing after 1 week of home treatment. ? · You have any new symptoms, such as nausea or balance problems. Where can you learn more? Go to http://mason-rosario.info/. Enter P510 in the search box to learn more about \"Earwax Blockage: Care Instructions. \"  Current as of: March 20, 2017  Content Version: 11.4  © 1181-8941 OrderGroove. Care instructions adapted under license by CareToSave (which disclaims liability or warranty for this information).  If you have questions about a medical condition or this instruction, always ask your healthcare professional. [address] any warranty or liability for your use of this information. Sinusitis: Care Instructions  Your Care Instructions    Sinusitis is an infection of the lining of the sinus cavities in your head. Sinusitis often follows a cold. It causes pain and pressure in your head and face. In most cases, sinusitis gets better on its own in 1 to 2 weeks. But some mild symptoms may last for several weeks. Sometimes antibiotics are needed. Follow-up care is a key part of your treatment and safety. Be sure to make and go to all appointments, and call your doctor if you are having problems. It's also a good idea to know your test results and keep a list of the medicines you take. How can you care for yourself at home? · Take an over-the-counter pain medicine, such as acetaminophen (Tylenol), ibuprofen (Advil, Motrin), or naproxen (Aleve). Read and follow all instructions on the label. · If the doctor prescribed antibiotics, take them as directed. Do not stop taking them just because you feel better. You need to take the full course of antibiotics. · Be careful when taking over-the-counter cold or flu medicines and Tylenol at the same time. Many of these medicines have acetaminophen, which is Tylenol. Read the labels to make sure that you are not taking more than the recommended dose. Too much acetaminophen (Tylenol) can be harmful. · Breathe warm, moist air from a steamy shower, a hot bath, or a sink filled with hot water. Avoid cold, dry air. Using a humidifier in your home may help. Follow the directions for cleaning the machine. · Use saline (saltwater) nasal washes to help keep your nasal passages open and wash out mucus and bacteria. You can buy saline nose drops at a grocery store or drugstore. Or you can make your own at home by adding 1 teaspoon of salt and 1 teaspoon of baking soda to 2 cups of distilled water. If you make your own, fill a bulb syringe with the solution, insert the tip into your nostril, and squeeze gently. Osborne Spurr your nose.   · Put a hot, wet towel or a warm gel pack on your face 3 or 4 times a day for 5 to 10 minutes each time. · Try a decongestant nasal spray like oxymetazoline (Afrin). Do not use it for more than 3 days in a row. Using it for more than 3 days can make your congestion worse. When should you call for help? Call your doctor now or seek immediate medical care if:  ? · You have new or worse swelling or redness in your face or around your eyes. ? · You have a new or higher fever. ? Watch closely for changes in your health, and be sure to contact your doctor if:  ? · You have new or worse facial pain. ? · The mucus from your nose becomes thicker (like pus) or has new blood in it. ? · You are not getting better as expected. Where can you learn more? Go to http://mason-rosario.info/. Enter B990 in the search box to learn more about \"Sinusitis: Care Instructions. \"  Current as of: May 12, 2017  Content Version: 11.4  © 6550-0861 [employer]. Care instructions adapted under license by ActionX (which disclaims liability or warranty for this information). If you have questions about a medical condition or this instruction, always ask your healthcare professional. [address] any warranty or liability for your use of this information.

## 2018-06-03 NOTE — PROGRESS NOTES
Patient is a 19 y.o. female presenting with cold symptoms. Cold Symptoms   The history is provided by the patient. This is a new problem. Episode onset: 4 days ago. The problem has been gradually worsening. The cough is productive of sputum. There has been no fever. Associated symptoms include chills, rhinorrhea, sore throat and wheezing. Pertinent negatives include no chest pain, no sweats, no ear congestion, no ear pain, no headaches, no myalgias, no shortness of breath, no nausea and no vomiting. She has tried nothing for the symptoms. She is not a smoker. Her past medical history is significant for asthma. Past Medical History:   Diagnosis Date    Abdominal pain, other specified site 3/3/2015    Asthma     Premature infant         History reviewed. No pertinent surgical history. Family History   Problem Relation Age of Onset    Hypertension Father     Cancer Maternal Grandmother     Stroke Maternal Grandfather     Hypertension Paternal Grandmother     Hypertension Paternal Grandfather         Social History     Social History    Marital status: SINGLE     Spouse name: N/A    Number of children: N/A    Years of education: N/A     Occupational History    Not on file. Social History Main Topics    Smoking status: Never Smoker    Smokeless tobacco: Never Used    Alcohol use No    Drug use: No    Sexual activity: No     Other Topics Concern    Not on file     Social History Narrative                ALLERGIES: Tamiflu [oseltamivir]    Review of Systems   Constitutional: Positive for chills. Negative for activity change, appetite change and fever. HENT: Positive for congestion, rhinorrhea, sinus pain, sinus pressure and sore throat. Negative for ear pain and trouble swallowing. Respiratory: Positive for wheezing. Negative for cough and shortness of breath. Cardiovascular: Negative for chest pain and palpitations. Gastrointestinal: Negative for nausea and vomiting. Musculoskeletal: Negative for myalgias. Neurological: Negative for dizziness and headaches. Hematological: Negative for adenopathy. Vitals:    06/03/18 1144   BP: 141/79   Pulse: 70   Resp: 18   Temp: 97.8 °F (36.6 °C)   SpO2: 96%   Weight: 195 lb (88.5 kg)   Height: 5' 2\" (1.575 m)       Physical Exam   Constitutional: She appears well-developed and well-nourished. No distress. HENT:   Right Ear: Tympanic membrane and ear canal normal. There is swelling and tenderness. Left Ear: Tympanic membrane, external ear and ear canal normal.   Nose: Rhinorrhea present. Right sinus exhibits maxillary sinus tenderness. Right sinus exhibits no frontal sinus tenderness. Left sinus exhibits maxillary sinus tenderness. Left sinus exhibits no frontal sinus tenderness. Mouth/Throat: Mucous membranes are normal. Posterior oropharyngeal edema and posterior oropharyngeal erythema present. No oropharyngeal exudate or tonsillar abscesses. Bilateral ear canal: cerumen impaction s/p ear lavage   Cardiovascular: Normal rate, regular rhythm and normal heart sounds. Pulmonary/Chest: Effort normal and breath sounds normal. No respiratory distress. She has no wheezes. She has no rales. Lymphadenopathy:     She has cervical adenopathy. Neurological: She is alert. Skin: She is not diaphoretic. Psychiatric: She has a normal mood and affect. Her behavior is normal. Judgment and thought content normal.   Nursing note and vitals reviewed. MDM    Procedures        ICD-10-CM ICD-9-CM    1. Bronchitis J40 490    2. Sinusitis, unspecified chronicity, unspecified location J32.9 473.9    3. Bilateral impacted cerumen H61.23 380.4 REMOVE IMPACTED EAR WAX   4.  Acute otitis externa, unspecified laterality, unspecified type H60.509 380.10      Medications Ordered Today   Medications    predniSONE (STERAPRED) 5 mg dose pack     Sig: See administration instruction per 5mg dose pack     Dispense:  21 Tab     Refill:  0    azithromycin (ZITHROMAX) 250 mg tablet     Sig: Take two tablets today then one tablet daily     Dispense:  6 Tab     Refill:  0    albuterol (PROVENTIL HFA, VENTOLIN HFA, PROAIR HFA) 90 mcg/actuation inhaler     Sig: Take 2 Puffs by inhalation every four (4) hours as needed for Wheezing. Dispense:  1 Inhaler     Refill:  0    ofloxacin (FLOXIN) 0.3 % otic solution     Sig: Administer 10 Drops in right ear daily for 7 days. Dispense:  5 mL     Refill:  0     The patients condition was discussed with the patient and they understand. The patient is to follow up with PCP [facility]. If signs and symptoms become worse the pt is to go to the ER. The patient is to take medications as prescribed.

## 2018-08-07 ENCOUNTER — OFFICE VISIT (OUTPATIENT)
Dept: URGENT CARE | Age: 18
End: 2018-08-07

## 2018-08-07 VITALS
WEIGHT: 200 LBS | SYSTOLIC BLOOD PRESSURE: 131 MMHG | DIASTOLIC BLOOD PRESSURE: 80 MMHG | HEIGHT: 62 IN | OXYGEN SATURATION: 99 % | HEART RATE: 77 BPM | RESPIRATION RATE: 18 BRPM | TEMPERATURE: 97.1 F | BODY MASS INDEX: 36.8 KG/M2

## 2018-08-07 DIAGNOSIS — J01.90 ACUTE SINUSITIS, RECURRENCE NOT SPECIFIED, UNSPECIFIED LOCATION: ICD-10-CM

## 2018-08-07 DIAGNOSIS — J30.9 ALLERGIC RHINITIS, UNSPECIFIED SEASONALITY, UNSPECIFIED TRIGGER: Primary | ICD-10-CM

## 2018-08-07 RX ORDER — TRIAMCINOLONE ACETONIDE 55 UG/1
2 SPRAY, METERED NASAL DAILY
Qty: 1 BOTTLE | Refills: 1 | Status: SHIPPED | OUTPATIENT
Start: 2018-08-07 | End: 2019-03-22

## 2018-08-07 RX ORDER — PREDNISONE 20 MG/1
TABLET ORAL
Qty: 8 TAB | Refills: 0 | Status: SHIPPED | OUTPATIENT
Start: 2018-08-07 | End: 2018-10-13

## 2018-08-07 RX ORDER — ACETAMINOPHEN AND CODEINE PHOSPHATE 120; 12 MG/5ML; MG/5ML
1 SOLUTION ORAL DAILY
COMMUNITY
End: 2018-10-13

## 2018-08-07 NOTE — PATIENT INSTRUCTIONS
Follow up with PCP without improvement over next 5-7 days sooner/immediately for new or worsening       Sinusitis: Care Instructions  Your Care Instructions    Sinusitis is an infection of the lining of the sinus cavities in your head. Sinusitis often follows a cold. It causes pain and pressure in your head and face. In most cases, sinusitis gets better on its own in 1 to 2 weeks. But some mild symptoms may last for several weeks. Sometimes antibiotics are needed. Follow-up care is a key part of your treatment and safety. Be sure to make and go to all appointments, and call your doctor if you are having problems. It's also a good idea to know your test results and keep a list of the medicines you take. How can you care for yourself at home? · Take an over-the-counter pain medicine, such as acetaminophen (Tylenol), ibuprofen (Advil, Motrin), or naproxen (Aleve). Read and follow all instructions on the label. · If the doctor prescribed antibiotics, take them as directed. Do not stop taking them just because you feel better. You need to take the full course of antibiotics. · Be careful when taking over-the-counter cold or flu medicines and Tylenol at the same time. Many of these medicines have acetaminophen, which is Tylenol. Read the labels to make sure that you are not taking more than the recommended dose. Too much acetaminophen (Tylenol) can be harmful. · Breathe warm, moist air from a steamy shower, a hot bath, or a sink filled with hot water. Avoid cold, dry air. Using a humidifier in your home may help. Follow the directions for cleaning the machine. · Use saline (saltwater) nasal washes to help keep your nasal passages open and wash out mucus and bacteria. You can buy saline nose drops at a grocery store or drugstore. Or you can make your own at home by adding 1 teaspoon of salt and 1 teaspoon of baking soda to 2 cups of distilled water.  If you make your own, fill a bulb syringe with the solution, insert the tip into your nostril, and squeeze gently. Melvia Creamer your nose. · Put a hot, wet towel or a warm gel pack on your face 3 or 4 times a day for 5 to 10 minutes each time. · Try a decongestant nasal spray like oxymetazoline (Afrin). Do not use it for more than 3 days in a row. Using it for more than 3 days can make your congestion worse. When should you call for help? Call your doctor now or seek immediate medical care if:    · You have new or worse swelling or redness in your face or around your eyes.     · You have a new or higher fever.    Watch closely for changes in your health, and be sure to contact your doctor if:    · You have new or worse facial pain.     · The mucus from your nose becomes thicker (like pus) or has new blood in it.     · You are not getting better as expected. Where can you learn more? Go to http://mason-rosario.info/. Enter U850 in the search box to learn more about \"Sinusitis: Care Instructions. \"  Current as of: May 12, 2017  Content Version: 11.7  © 4631-5633 [employer]. Care instructions adapted under license by [employer] (which disclaims liability or warranty for this information). If you have questions about a medical condition or this instruction, always ask your healthcare professional. [address] any warranty or liability for your use of this information.

## 2018-08-07 NOTE — MR AVS SNAPSHOT
[facility] [address] [place] [address] 
[phone] Patient: Yanet Lea MRN: [birth year] Visit Information Date & Time Provider Department Dept. Phone Encounter #  
 8/7/2018  6:30 PM [facility] [phone] [id number] Upcoming Health Maintenance Date Due Hepatitis A Peds Age 1-18 (1 of 2 - Standard Series) 5/29/1999 DTaP/Tdap/Td series (1 - Tdap) 5/29/2005 HPV Age 9Y-34Y (1 of 3 - Female 3 Dose Series) 5/29/2009 Influenza Age 5 to Adult 8/1/2018 Allergies as of 8/7/2018  Review Complete On: 8/7/2018 By: My Shepherd RN Severity Noted Reaction Type Reactions Tamiflu [Oseltamivir]  02/13/2018    Hives Current Immunizations  Never Reviewed No immunizations on file. Not reviewed this visit You Were Diagnosed With   
  
 Codes Comments Allergic rhinitis, unspecified seasonality, unspecified trigger    -  Primary ICD-10-CM: J30.9 ICD-9-CM: 477.9 Acute sinusitis, recurrence not specified, unspecified location     ICD-10-CM: J01.90 ICD-9-CM: 461.9 Vitals BP Pulse Temp Resp Height(growth percentile) Weight(growth percentile) 131/80 77 97.1 °F (36.2 °C) 18 5' 2\" (1.575 m) 200 lb (90.7 kg) LMP SpO2 BMI OB Status Smoking Status 07/09/2018 (Exact Date) 99% 36.58 kg/m2 Having regular periods Never Smoker BMI and BSA Data Body Mass Index Body Surface Area  
 36.58 kg/m 2 1.99 m 2 Preferred Pharmacy Pharmacy Name Phone [id number]. [phone] Your Updated Medication List  
  
   
This list is accurate as of 8/7/18  7:31 PM.  Always use your most recent med list.  
  
  
  
  
 albuterol 90 mcg/actuation inhaler Commonly known as:  PROVENTIL HFA, VENTOLIN HFA, PROAIR HFA Take 2 Puffs by inhalation every four (4) hours as needed for Wheezing. MARLEN 0.35 mg Tab Generic drug:  norethindrone Take 1 Tab by mouth daily. oxymetazoline 0.05 % Mist  
Commonly known as:  AFRIN NO DRIP(OXYMETAZOLIN) 1-2 sprays each nostril 2 times daily for no more than 3 days. predniSONE 20 mg tablet Commonly known as:  Therman Rail Take 2 tabs by mouth one time daily with food for 4 days. SINGULAIR 10 mg tablet Generic drug:  montelukast  
Take 10 mg by mouth daily. triamcinolone 55 mcg nasal inhaler Commonly known as:  NASACORT AQ  
2 Sprays by Both Nostrils route daily. Prescriptions Sent to Pharmacy Refills  
 oxymetazoline (AFRIN NO DRIP,OXYMETAZOLIN,) 0.05 % mist 0 Si-2 sprays each nostril 2 times daily for no more than 3 days. Class: Normal  
 Pharmacy: [facility] [address] Ph #: [phone]  
 triamcinolone (NASACORT AQ) 55 mcg nasal inhaler 1 Si Sprays by Both Nostrils route daily. Class: Normal  
 Pharmacy: [address] [place] [address]. Ph #: [phone] Route: Both Nostrils  
 predniSONE (DELTASONE) 20 mg tablet 0 Sig: Take 2 tabs by mouth one time daily with food for 4 days. Class: Normal  
 Pharmacy: [address] [place] [address] [address] [place] [address]. Ph #: [phone] Patient Instructions Follow up with PCP without improvement over next 5-7 days sooner/immediately for new or worsening Sinusitis: Care Instructions Your Care Instructions Sinusitis is an infection of the lining of the sinus cavities in your head. Sinusitis often follows a cold. It causes pain and pressure in your head and face. In most cases, sinusitis gets better on its own in 1 to 2 weeks. But some mild symptoms may last for several weeks. Sometimes antibiotics are needed. Follow-up care is a key part of your treatment and safety.  Be sure to make and go to all appointments, and call your doctor if you are having problems. It's also a good idea to know your test results and keep a list of the medicines you take. How can you care for yourself at home? · Take an over-the-counter pain medicine, such as acetaminophen (Tylenol), ibuprofen (Advil, Motrin), or naproxen (Aleve). Read and follow all instructions on the label. · If the doctor prescribed antibiotics, take them as directed. Do not stop taking them just because you feel better. You need to take the full course of antibiotics. · Be careful when taking over-the-counter cold or flu medicines and Tylenol at the same time. Many of these medicines have acetaminophen, which is Tylenol. Read the labels to make sure that you are not taking more than the recommended dose. Too much acetaminophen (Tylenol) can be harmful. · Breathe warm, moist air from a steamy shower, a hot bath, or a sink filled with hot water. Avoid cold, dry air. Using a humidifier in your home may help. Follow the directions for cleaning the machine. · Use saline (saltwater) nasal washes to help keep your nasal passages open and wash out mucus and bacteria. You can buy saline nose drops at a grocery store or drugstore. Or you can make your own at home by adding 1 teaspoon of salt and 1 teaspoon of baking soda to 2 cups of distilled water. If you make your own, fill a bulb syringe with the solution, insert the tip into your nostril, and squeeze gently. [place] your nose. · Put a hot, wet towel or a warm gel pack on your face 3 or 4 times a day for 5 to 10 minutes each time. · Try a decongestant nasal spray like oxymetazoline (Afrin). Do not use it for more than 3 days in a row. Using it for more than 3 days can make your congestion worse. When should you call for help? Call your doctor now or seek immediate medical care if: 
  · You have new or worse swelling or redness in your face or around your eyes.  
  · You have a new or higher fever.  Watch closely for changes in your health, and be sure to contact your doctor if: 
  · You have new or worse facial pain.  
  · The mucus from your nose becomes thicker (like pus) or has new blood in it.  
  · You are not getting better as expected. Where can you learn more? Go to http://mason-rosario.info/. Enter Y413 in the search box to learn more about \"Sinusitis: Care Instructions. \" Current as of: May 12, 2017 Content Version: 11.7 © 3484-6041 [employer]. Care instructions adapted under license by [employer] (which disclaims liability or warranty for this information). If you have questions about a medical condition or this instruction, always ask your healthcare professional. [address] any warranty or liability for your use of this information. Introducing [facility]! Susi Xie introduces [facility] patient portal. Now you can access parts of your medical record, email your doctor's office, and request medication refills online. 1. In your internet browser, go to [email or web address] 2. Click on the First Time User? Click Here link in the Sign In box. You will see the New Member Sign Up page. 3. Enter your [facility] Access Code exactly as it appears below. You will not need to use this code after youve completed the sign-up process. If you do not sign up before the expiration date, you must request a new code. · [facility] Access Code: [email or web address] Expires: 9/1/2018 12:42 PM 
 
4. Enter the last four digits of your Social Security Number (xxxx) and Date of Birth (mm/dd/yyyy) as indicated and click Submit. You will be taken to the next sign-up page. 5. Create a [facility] ID. This will be your [facility] login ID and cannot be changed, so think of one that is secure and easy to remember. 6. Create a [employer] password. You can change your password at any time. 7. Enter your Password Reset Question and Answer. This can be used at a later time if you forget your password. 8. Enter your e-mail address. You will receive e-mail notification when new information is available in [address] 9. Click Sign Up. You can now view and download portions of your medical record. 10. Click the Download Summary menu link to download a portable copy of your medical information. If you have questions, please visit the Frequently Asked Questions section of the [employer] website. Remember, [employer] is NOT to be used for urgent needs. For medical emergencies, dial 911. Now available from your iPhone and Android! Please provide this summary of care documentation to your next provider. Your primary care clinician is listed as Lisa Thompson. If you have any questions after today's visit, please call [phone].

## 2018-08-07 NOTE — PROGRESS NOTES
HPI Comments:   Here for sinus pain and pressure x 2 days. Associated post nasal drip and scratchy throat. Denies fever, chills or severe headache. Overall not improved. Has been taking benadryl type antihistamine with OTC nyquil and dayquil. This has not helped much. Maintaining adequate fluid intake. Overall not improved. Hx of asthma, denies wheeze night awakening or SOB. Patient is a 19 y.o. female presenting with sinus pain. Sinus Pain   Pertinent negatives include no shortness of breath. Past Medical History:   Diagnosis Date    Abdominal pain, other specified site 3/3/2015    Asthma     Premature infant         History reviewed. No pertinent surgical history. Family History   Problem Relation Age of Onset    Hypertension Father     Cancer Maternal Grandmother     Stroke Maternal Grandfather     Hypertension Paternal Grandmother     Hypertension Paternal Grandfather         Social History     Social History    Marital status: SINGLE     Spouse name: N/A    Number of children: N/A    Years of education: N/A     Occupational History    Not on file. Social History Main Topics    Smoking status: Never Smoker    Smokeless tobacco: Never Used    Alcohol use No    Drug use: No    Sexual activity: No     Other Topics Concern    Not on file     Social History Narrative                ALLERGIES: Tamiflu [oseltamivir]    Review of Systems   Constitutional: Negative for chills and fever. HENT: Positive for sinus pain. Respiratory: Negative for chest tightness, shortness of breath, wheezing and stridor. Neurological: Negative for dizziness and weakness. Vitals:    08/07/18 1856   BP: 131/80   Pulse: 77   Resp: 18   Temp: 97.1 °F (36.2 °C)   SpO2: 99%   Weight: 200 lb (90.7 kg)   Height: 5' 2\" (1.575 m)       Physical Exam   Constitutional: She is oriented to person, place, and time. No distress.    HENT:   Pale swollen nasal turbinates with thick mucus in nasal passages bilat. OP with post nasal drip on pharynx wall. Uvula midline. Mucus membranes moist. TMs normal.   Eyes: Conjunctivae and EOM are normal. Pupils are equal, round, and reactive to light. Neck: Normal range of motion. Neck supple. Cardiovascular: Normal rate, regular rhythm and normal heart sounds. Exam reveals no gallop and no friction rub. No murmur heard. Pulmonary/Chest: Effort normal and breath sounds normal. No respiratory distress. She has no wheezes. She has no rales. Neurological: She is alert and oriented to person, place, and time. Skin: Skin is warm and dry. No rash noted. She is not diaphoretic. Psychiatric: She has a normal mood and affect. Her behavior is normal. Thought content normal.       MDM     Differential Diagnosis; Clinical Impression; Plan:       CLINICAL IMPRESSION:  (J30.9) Allergic rhinitis, unspecified seasonality, unspecified trigger  (primary encounter diagnosis)  (J01.90) Acute sinusitis, recurrence not specified, unspecified location    Orders Placed This Encounter  RX      oxymetazoline (AFRIN NO DRIP,OXYMETAZOLIN,) 0.05 % mist      triamcinolone (NASACORT AQ) 55 mcg nasal inhaler      predniSONE (DELTASONE) 20 mg tablet ( 40mg/day x 4 days)      Plan:  1. See above orders  2. Please discontinue ALL cold/old anti-histamines they are too drying for your sinuses  3. Review handouts   4. Maintain adequate fluid intake      We have reviewed concerning signs/symptoms, normal vs abnormal progression of medical condition and when to seek immediate medical attention. Schedule with PCP or Urgent Care immediately for worsening or new symptoms. See your PCP if there is not at least some improvement in symptoms within the next 5-7 days. You should see your PCP for updates on your routine health maintenance.        Procedures

## 2018-10-13 ENCOUNTER — OFFICE VISIT (OUTPATIENT)
Dept: URGENT CARE | Age: 18
End: 2018-10-13

## 2018-10-13 VITALS
TEMPERATURE: 97.5 F | BODY MASS INDEX: 36.46 KG/M2 | HEART RATE: 89 BPM | HEIGHT: 62 IN | RESPIRATION RATE: 16 BRPM | DIASTOLIC BLOOD PRESSURE: 81 MMHG | WEIGHT: 198.1 LBS | SYSTOLIC BLOOD PRESSURE: 130 MMHG | OXYGEN SATURATION: 99 %

## 2018-10-13 DIAGNOSIS — B35.4 TINEA CORPORIS: Primary | ICD-10-CM

## 2018-10-13 RX ORDER — CHLORPHENIRAMINE MALEATE 4 MG
TABLET ORAL 2 TIMES DAILY
Qty: 45 G | Refills: 0 | Status: SHIPPED | OUTPATIENT
Start: 2018-10-13 | End: 2018-10-27

## 2018-10-13 NOTE — MR AVS SNAPSHOT
[address] [facility] [address] 
[phone] Patient: Rodrick Ramirez MRN: [id number] KH Visit Information Date & Time Provider Department Dept. Phone Encounter #  
 10/13/2018  2:00 PM [address] [facility] [phone] [id number] Upcoming Health Maintenance Date Due Hepatitis A Peds Age 1-18 (1 of 2 - Standard Series) 1999 DTaP/Tdap/Td series (1 - Tdap) 2005 HPV Age 9Y-34Y (1 of 3 - Female 3 Dose Series) 2009 Influenza Age 5 to Adult 2018 Allergies as of 10/13/2018  Review Complete On: 10/13/2018 By: Toyin Chavez RN Severity Noted Reaction Type Reactions Tamiflu [Oseltamivir]  2018    Hives Current Immunizations  Never Reviewed No immunizations on file. Not reviewed this visit You Were Diagnosed With   
  
 Codes Comments Tinea corporis    -  Primary ICD-10-CM: B35.4 ICD-9-CM: 110.5 Vitals BP Pulse Temp Resp Height(growth percentile) Weight(growth percentile) 130/81 89 97.5 °F (36.4 °C) 16 5' 2\" (1.575 m) 198 lb 1.6 oz (89.9 kg) LMP SpO2 BMI OB Status Smoking Status 2018 99% 36.23 kg/m2 Having regular periods Never Smoker BMI and BSA Data Body Mass Index Body Surface Area  
 36.23 kg/m 2 1.98 m 2 Preferred Pharmacy Pharmacy Name Phone [id number]. [phone] Your Updated Medication List  
  
   
This list is accurate as of 10/13/18  4:21 PM.  Always use your most recent med list.  
  
  
  
  
 albuterol 90 mcg/actuation inhaler Commonly known as:  PROVENTIL HFA, VENTOLIN HFA, PROAIR HFA Take 2 Puffs by inhalation every four (4) hours as needed for Wheezing. clotrimazole 1 % topical cream  
Commonly known as:  [place] Apply  to affected area two (2) times a day for 14 days.   
  
 LO LOESTRIN FE PO  
 Take  by mouth. SINGULAIR 10 mg tablet Generic drug:  montelukast  
Take 10 mg by mouth daily. triamcinolone 55 mcg nasal inhaler Commonly known as:  NASACORT AQ  
2 Sprays by Both Nostrils route daily. Prescriptions Sent to Pharmacy Refills  
 clotrimazole (LOTRIMIN) 1 % topical cream 0 Sig: Apply  to affected area two (2) times a day for 14 days. Class: Normal  
 Pharmacy: [address] [place] [address] #: [phone] Route: Topical  
  
Patient Instructions Follow up with PCP without improvement over next 14 days sooner/immediately for new or worsening Ringworm: Care Instructions Your Care Instructions Ringworm is a fungus infection of the skin. It is not caused by a worm. Ringworm causes a round, scaly rash that may crack and itch. The rash can spread over a wide area. One type of fungus that causes ringworm is often found in locker rooms and swimming pools. It grows well in warm, moist areas of the skin, such as in skin folds. You can get ringworm by sharing towels, clothing, and sports equipment. You can also get it by touching someone who has ringworm. Ringworm is treated with cream that kills the fungus. If the rash is widespread, you may need pills to get rid of it. Ringworm often comes back after treatment. If the rash becomes infected with bacteria, you may need antibiotics. Follow-up care is a key part of your treatment and safety. Be sure to make and go to all appointments, and call your doctor if you are having problems. It's also a good idea to know your test results and keep a list of the medicines you take. How can you care for yourself at home? · Take your medicines exactly as prescribed. Call your doctor if you have any problems with your medicine. · Wash the rash with soap and water, remove flaky skin, and dry thoroughly. · Try an over-the-counter cream with clotrimazole or miconazole in it. Brand names include Lotrimin, Micatin, and Tinactin. Terbinafine cream (Lamisil) is also available without a prescription. Spread the cream beyond the edge or border of the rash. Follow the directions on the package. Do not stop using the medicine just because your skin clears up. You will probably need to continue treatment for 2 to 4 weeks. · To keep from getting another infection: ¨ Do not go barefoot in public places such as gyms or locker rooms. Avoid sharing towels and clothes. Use flip-flops or some other type of shoe in the shower. ¨ Do not wear tight clothes or let your skin stay damp for long periods, such as by staying in a wet bathing suit or sweaty clothes. When should you call for help? Call your doctor now or seek immediate medical care if: 
  · You have signs of infection such as: 
¨ Pain, warmth, or swelling in your skin. ¨ Red streaks near a wound in the skin. ¨ Pus coming from the rash on your skin. ¨ A fever.  
 Watch closely for changes in your health, and be sure to contact your doctor if: 
  · Your ringworm does not improve after 2 weeks of treatment.  
  · You do not get better as expected. Where can you learn more? Go to http://mason-rosario.info/. Enter Z667 in the search box to learn more about \"Ringworm: Care Instructions. \" Current as of: April 18, 2018 Content Version: 11.8 © 2146-6215 [employer]. Care instructions adapted under license by [employer] (which disclaims liability or warranty for this information). If you have questions about a medical condition or this instruction, always ask your healthcare professional. [address] any warranty or liability for your use of this information. Introducing [facility] HE[facility]! [facility] introduces [employer] patient portal. Now you can access parts of your medical record, email your doctor's office, and request medication refills online. 1. In your internet browser, go to [email or web address] 2. Click on the First Time User? Click Here link in the Sign In box. You will see the New Member Sign Up page. 3. Enter your [employer] Access Code exactly as it appears below. You will not need to use this code after youve completed the sign-up process. If you do not sign up before the expiration date, you must request a new code. · [employer] Access Code: [email or web address] Expires: 1/11/2019  4:21 PM 
 
4. Enter the last four digits of your Social Security Number (xxxx) and Date of Birth (mm/dd/yyyy) as indicated and click Submit. You will be taken to the next sign-up page. 5. Create a [facility] ID. This will be your [employer] login ID and cannot be changed, so think of one that is secure and easy to remember. 6. Create a [employer] password. You can change your password at any time. 7. Enter your Password Reset Question and Answer. This can be used at a later time if you forget your password. 8. Enter your e-mail address. You will receive e-mail notification when new information is available in [address] 9. Click Sign Up. You can now view and download portions of your medical record. 10. Click the Download Summary menu link to download a portable copy of your medical information. If you have questions, please visit the Frequently Asked Questions section of the [employer] website. Remember, [employer] is NOT to be used for urgent needs. For medical emergencies, dial 911. Now available from your iPhone and Android! Please provide this summary of care documentation to your next provider. Your primary care clinician is listed as Faheem Martínez. If you have any questions after today's visit, please call [phone].

## 2018-10-13 NOTE — PROGRESS NOTES
HPI Comments:   2 days of rash on stomach a few spots that are mildly itchy. Has another lesion ring shaped on left wrist that thinks is ringworm. Has tried old cream on wrist location without relief. No pain. No fever, chills and she otherwise feels well. Patient is a 19 y.o. female presenting with rash. Rash           Past Medical History:   Diagnosis Date    Abdominal pain, other specified site 3/3/2015    Asthma     Premature infant         History reviewed. No pertinent surgical history. Family History   Problem Relation Age of Onset    Hypertension Father     Cancer Maternal Grandmother     Stroke Maternal Grandfather     Hypertension Paternal Grandmother     Hypertension Paternal Grandfather         Social History     Social History    Marital status: SINGLE     Spouse name: N/A    Number of children: N/A    Years of education: N/A     Occupational History    Not on file. Social History Main Topics    Smoking status: Never Smoker    Smokeless tobacco: Never Used    Alcohol use No    Drug use: No    Sexual activity: No     Other Topics Concern    Not on file     Social History Narrative                ALLERGIES: Tamiflu [oseltamivir]    Review of Systems   Skin: Positive for rash. All other systems reviewed and are negative. Vitals:    10/13/18 1556   BP: 130/81   Pulse: 89   Resp: 16   Temp: 97.5 °F (36.4 °C)   SpO2: 99%   Weight: 198 lb 1.6 oz (89.9 kg)   Height: 5' 2\" (1.575 m)       Physical Exam   Constitutional: She is oriented to person, place, and time. No distress. HENT:   Head: Normocephalic and atraumatic. Mouth/Throat: No oropharyngeal exudate. Eyes: Conjunctivae and EOM are normal.   Neck: Neck supple. Cardiovascular: Normal rate, regular rhythm and normal heart sounds. Pulmonary/Chest: Effort normal and breath sounds normal.   Lymphadenopathy:     She has no cervical adenopathy.    Neurological: She is alert and oriented to person, place, and time.   Skin:    Rough scaly lesions x 3 on stomach. Annular lesion with central clearing that is scaly on dorsum of left wrist. All consistent with suspected ringworm. Psychiatric: She has a normal mood and affect. Her behavior is normal.       MDM     Differential Diagnosis; Clinical Impression; Plan:       CLINICAL IMPRESSION:  (B35.4) Tinea corporis  (primary encounter diagnosis)    Orders Placed This Encounter  RX      clotrimazole (LOTRIMIN) 1 % topical cream      Plan:  1. Rash on abdomen likely early ringworm; has lesion on wrist that is ringworm. 2. Start above topical  3. Without improvement, need to see PCP to discuss other/oral options      We have reviewed concerning signs/symptoms, normal vs abnormal progression of medical condition and when to seek immediate medical attention. Schedule with PCP or Urgent Care immediately for worsening or new symptoms. See your PCP if there is not at least some improvement in symptoms within the next 2 weeks  You should see your PCP for updates on your routine health maintenance.          Procedures

## 2018-10-13 NOTE — PATIENT INSTRUCTIONS
Follow up with PCP without improvement over next 14 days sooner/immediately for new or worsening       Ringworm: Care Instructions  Your Care Instructions  Ringworm is a fungus infection of the skin. It is not caused by a worm. Ringworm causes a round, scaly rash that may crack and itch. The rash can spread over a wide area. One type of fungus that causes ringworm is often found in locker rooms and swimming pools. It grows well in warm, moist areas of the skin, such as in skin folds. You can get ringworm by sharing towels, clothing, and sports equipment. You can also get it by touching someone who has ringworm. Ringworm is treated with cream that kills the fungus. If the rash is widespread, you may need pills to get rid of it. Ringworm often comes back after treatment. If the rash becomes infected with bacteria, you may need antibiotics. Follow-up care is a key part of your treatment and safety. Be sure to make and go to all appointments, and call your doctor if you are having problems. It's also a good idea to know your test results and keep a list of the medicines you take. How can you care for yourself at home? · Take your medicines exactly as prescribed. Call your doctor if you have any problems with your medicine. · Wash the rash with soap and water, remove flaky skin, and dry thoroughly. · Try an over-the-counter cream with clotrimazole or miconazole in it. Brand names include Lotrimin, Micatin, and Tinactin. Terbinafine cream (Lamisil) is also available without a prescription. Spread the cream beyond the edge or border of the rash. Follow the directions on the package. Do not stop using the medicine just because your skin clears up. You will probably need to continue treatment for 2 to 4 weeks. · To keep from getting another infection:  ¨ Do not go barefoot in public places such as gyms or locker rooms. Avoid sharing towels and clothes. Use flip-flops or some other type of shoe in the shower.   ¨ Do not wear tight clothes or let your skin stay damp for long periods, such as by staying in a wet bathing suit or sweaty clothes. When should you call for help? Call your doctor now or seek immediate medical care if:    · You have signs of infection such as:  ¨ Pain, warmth, or swelling in your skin. ¨ Red streaks near a wound in the skin. ¨ Pus coming from the rash on your skin. ¨ A fever.    Watch closely for changes in your health, and be sure to contact your doctor if:    · Your ringworm does not improve after 2 weeks of treatment.     · You do not get better as expected. Where can you learn more? Go to http://mason-rosario.info/. Enter D983 in the search box to learn more about \"Ringworm: Care Instructions. \"  Current as of: April 18, 2018  Content Version: 11.8  © 0963-0299 [employer]. Care instructions adapted under license by [employer] (which disclaims liability or warranty for this information). If you have questions about a medical condition or this instruction, always ask your healthcare professional. [address] any warranty or liability for your use of this information.

## 2018-11-20 ENCOUNTER — OFFICE VISIT (OUTPATIENT)
Dept: URGENT CARE | Age: 18
End: 2018-11-20

## 2018-11-20 VITALS
TEMPERATURE: 97.6 F | HEIGHT: 62 IN | RESPIRATION RATE: 18 BRPM | DIASTOLIC BLOOD PRESSURE: 63 MMHG | OXYGEN SATURATION: 98 % | SYSTOLIC BLOOD PRESSURE: 137 MMHG | BODY MASS INDEX: 35.51 KG/M2 | WEIGHT: 193 LBS | HEART RATE: 93 BPM

## 2018-11-20 DIAGNOSIS — J45.41 MODERATE PERSISTENT ASTHMA WITH ACUTE EXACERBATION: Primary | ICD-10-CM

## 2018-11-20 RX ORDER — PREDNISONE 20 MG/1
TABLET ORAL
Qty: 8 TAB | Refills: 0 | Status: SHIPPED | OUTPATIENT
Start: 2018-11-20 | End: 2019-03-22

## 2018-11-20 RX ORDER — ALBUTEROL SULFATE 90 UG/1
2 AEROSOL, METERED RESPIRATORY (INHALATION)
Qty: 1 INHALER | Refills: 0 | Status: SHIPPED | OUTPATIENT
Start: 2018-11-20 | End: 2019-03-22

## 2018-11-21 NOTE — PATIENT INSTRUCTIONS
You need to follow up with your PCP within 1 week to further discuss ashtma management/re evaluation  Return immediately or go to emergency department for any new, worsening or changes     Asthma in Adults: Care Instructions  Your Care Instructions    During an asthma attack, your airways swell and narrow as a reaction to certain things (triggers). This makes it hard to breathe. You may be able to prevent asthma attacks if you avoid the things that set off your asthma symptoms. Keeping your asthma under control and treating symptoms before they get bad can help you avoid severe attacks. If you can control your asthma, you may be able to do all of your normal daily activities. You may also avoid asthma attacks and trips to the hospital.  Follow-up care is a key part of your treatment and safety. Be sure to make and go to all appointments, and call your doctor if you are having problems. It's also a good idea to know your test results and keep a list of the medicines you take. How can you care for yourself at home? · Follow your asthma action plan so you can manage your symptoms at home. An asthma action plan will help you prevent and control airway reactions and will tell you what to do during an asthma attack. If you do not have an asthma action plan, work with your doctor to build one. · Take your asthma medicine exactly as prescribed. Medicine plays an important role in controlling asthma. Talk to your doctor right away if you have any questions about what to take and how to take it. ? Use your quick-relief medicine when you have symptoms of an attack. Quick-relief medicine often is an albuterol inhaler. Some people need to use quick-relief medicine before they exercise. ? Take your controller medicine every day, not just when you have symptoms. Controller medicine is usually an inhaled corticosteroid. The goal is to prevent problems before they occur.  Do not use your controller medicine to try to treat an attack that has already started. It does not work fast enough to help. ? If your doctor prescribed corticosteroid pills to use during an attack, take them as directed. They may take hours to work, but they may shorten the attack and help you breathe better. ? Keep your quick-relief medicine with you at all times. · Talk to your doctor before using other medicines. Some medicines, such as aspirin, can cause asthma attacks in some people. · Check yourself for asthma symptoms to know which step to follow in your action plan. Watch for things like being short of breath, having chest tightness, coughing, and wheezing. Also notice if symptoms wake you up at night or if you get tired quickly when you exercise. · If you have a peak flow meter, use it to check how well you are breathing. This can help you predict when an asthma attack is going to occur. Then you can take medicine to prevent the asthma attack or make it less severe. · See your doctor regularly. These visits will help you learn more about asthma and what you can do to control it. Your doctor will monitor your treatment to make sure the medicine is helping you. · Keep track of your asthma attacks and your treatment. After you have had an attack, write down what triggered it, what helped end it, and any concerns you have about your asthma action plan. Take your diary when you see your doctor. You can then review your asthma action plan and decide if it is working. · Do not smoke or allow others to smoke around you. Avoid smoky places. Smoking makes asthma worse. If you need help quitting, talk to your doctor about stop-smoking programs and medicines. These can increase your chances of quitting for good. · Learn what triggers an asthma attack for you, and avoid the triggers when you can. Common triggers include colds, smoke, air pollution, dust, pollen, mold, pets, cockroaches, stress, and cold air. · Avoid colds and the flu.  Get a pneumococcal vaccine shot. If you have had one before, ask your doctor whether you need a second dose. Get a flu vaccine every fall. If you must be around people with colds or the flu, wash your hands often. When should you call for help? Call 911 anytime you think you may need emergency care. For example, call if:    · You have severe trouble breathing.    Call your doctor now or seek immediate medical care if:    · Your symptoms do not get better after you have followed your asthma action plan.     · You cough up yellow, dark brown, or bloody mucus (sputum).    Watch closely for changes in your health, and be sure to contact your doctor if:    · Your coughing and wheezing get worse.     · You need to use quick-relief medicine on more than 2 days a week (unless it is just for exercise).     · You need help figuring out what is triggering your asthma attacks. Where can you learn more? Go to http://mason-rosario.info/. Enter P597 in the search box to learn more about \"Asthma in Adults: Care Instructions. \"  Current as of: December 6, 2017  Content Version: 11.8  © 7412-9095 [employer]. Care instructions adapted under license by [employer] (which disclaims liability or warranty for this information). If you have questions about a medical condition or this instruction, always ask your healthcare professional. [address] any warranty or liability for your use of this information.

## 2018-11-25 NOTE — PROGRESS NOTES
Here for dry tight cough over past 4 days  Now with worsening wheeze and chest tightness  Hx of asthma. Using albuterol daily. + night awakenings nightly. No other associated symptoms except for mild nasal contestion  Denies: fever, chills, pleuritic chest pain, dizziness, near syncope  Overall not improving. Past Medical History:   Diagnosis Date    Abdominal pain, other specified site 3/3/2015    Asthma     Premature infant         History reviewed. No pertinent surgical history. Family History   Problem Relation Age of Onset    Hypertension Father     Cancer Maternal Grandmother     Stroke Maternal Grandfather     Hypertension Paternal Grandmother     Hypertension Paternal Grandfather         Social History     Socioeconomic History    Marital status: SINGLE     Spouse name: Not on file    Number of children: Not on file    Years of education: Not on file    Highest education level: Not on file   Social Needs    Financial resource strain: Not on file    Food insecurity - worry: Not on file    Food insecurity - inability: Not on file   CRAVE needs - medical: Not on file   CRAVE needs - non-medical: Not on file   Occupational History    Not on file   Tobacco Use    Smoking status: Never Smoker    Smokeless tobacco: Never Used   Substance and Sexual Activity    Alcohol use: No    Drug use: No    Sexual activity: No   Other Topics Concern    Not on file   Social History Narrative    Not on file                ALLERGIES: Tamiflu [oseltamivir]    Review of Systems   Constitutional: Negative for chills. Respiratory: Negative for stridor. All other systems reviewed and are negative. Vitals:    11/20/18 1906   BP: 137/63   Pulse: 93   Resp: 18   Temp: 97.6 °F (36.4 °C)   SpO2: 98%   Weight: 193 lb (87.5 kg)   Height: 5' 2\" (1.575 m)       Physical Exam   Constitutional: She is oriented to person, place, and time. No distress.    Non-toxic appearing, well hydrated   HENT:   Mouth/Throat: No oropharyngeal exudate. TMs normal.   Eyes: EOM are normal. Pupils are equal, round, and reactive to light. No scleral icterus. Neck: Normal range of motion. Neck supple. Cardiovascular: Regular rhythm and normal heart sounds. Exam reveals no gallop and no friction rub. No murmur heard. Pulmonary/Chest:   Scattered wheeze throughout lung fields bilat. No diminished breath sounds. Lymphadenopathy:     She has no cervical adenopathy. Neurological: She is alert and oriented to person, place, and time. Skin: Skin is warm and dry. No rash noted. She is not diaphoretic. No erythema. No pallor. Psychiatric: She has a normal mood and affect. Her behavior is normal. Thought content normal.   Nursing note and vitals reviewed. MDM     Differential Diagnosis; Clinical Impression; Plan:       CLINICAL IMPRESSION:  (J45.41) Moderate persistent asthma with acute exacerbation  (primary encounter diagnosis)    Orders Placed This Encounter      predniSONE (DELTASONE) 20 mg tablet      albuterol (PROVENTIL HFA, VENTOLIN HFA, PROAIR HFA) 90 mcg/actuation inhaler       Plan:  1. Rx burst prednisone for asthma exacerbation  2. Continue albuterol  3. Follow up with PCP within 5-7 days. For re eval.      We have reviewed concerning signs/symptoms, normal vs abnormal progression of medical condition and when to seek immediate medical attention. ED immediately for any new, worsening or changes.         Procedures

## 2019-01-15 ENCOUNTER — OFFICE VISIT (OUTPATIENT)
Dept: URGENT CARE | Age: 19
End: 2019-01-15

## 2019-01-15 VITALS
RESPIRATION RATE: 16 BRPM | WEIGHT: 197 LBS | DIASTOLIC BLOOD PRESSURE: 76 MMHG | HEIGHT: 62 IN | SYSTOLIC BLOOD PRESSURE: 121 MMHG | HEART RATE: 82 BPM | OXYGEN SATURATION: 98 % | BODY MASS INDEX: 36.25 KG/M2 | TEMPERATURE: 97.9 F

## 2019-01-15 DIAGNOSIS — M79.641 RIGHT HAND PAIN: Primary | ICD-10-CM

## 2019-01-15 RX ORDER — NAPROXEN 500 MG/1
500 TABLET ORAL
Qty: 30 TAB | Refills: 0 | Status: SHIPPED | OUTPATIENT
Start: 2019-01-15 | End: 2019-03-22

## 2019-01-15 NOTE — PATIENT INSTRUCTIONS

## 2019-01-15 NOTE — PROGRESS NOTES
Moustapha Fine presents with right hand pain, swelling since yesterday. Denies injury. Does report moving furniture prior. Denies numbness, tingling, weakness. Took Advil with mild relief. The history is provided by the patient. Past Medical History:   Diagnosis Date    Abdominal pain, other specified site 3/3/2015    Asthma     Premature infant         History reviewed. No pertinent surgical history. Family History   Problem Relation Age of Onset    Hypertension Father     Cancer Maternal Grandmother     Stroke Maternal Grandfather     Hypertension Paternal Grandmother     Hypertension Paternal Grandfather         Social History     Socioeconomic History    Marital status: SINGLE     Spouse name: Not on file    Number of children: Not on file    Years of education: Not on file    Highest education level: Not on file   Social Needs    Financial resource strain: Not on file    Food insecurity - worry: Not on file    Food insecurity - inability: Not on file   [employer] needs - medical: Not on file   [employer] needs - non-medical: Not on file   Occupational History    Not on file   Tobacco Use    Smoking status: Never Smoker    Smokeless tobacco: Never Used   Substance and Sexual Activity    Alcohol use: No    Drug use: No    Sexual activity: No   Other Topics Concern    Not on file   Social History Narrative    Not on file                ALLERGIES: Tamiflu [oseltamivir]    Review of Systems   Constitutional: Negative for chills and fever. Respiratory: Negative for shortness of breath and wheezing. Cardiovascular: Negative for chest pain and palpitations. Musculoskeletal: Positive for arthralgias and joint swelling. Skin: Negative for rash. Hematological: Negative for adenopathy.        Vitals:    01/15/19 0850   BP: 121/76   Pulse: 82   Resp: 16   Temp: 97.9 °F (36.6 °C)   SpO2: 98%   Weight: 197 lb (89.4 kg)   Height: 5' 2\" (1.575 m)       Physical Exam Constitutional: She appears well-developed and well-nourished. No distress. Musculoskeletal:        Right wrist: She exhibits normal range of motion, no tenderness, no bony tenderness, no swelling, no effusion, no crepitus, no deformity and no laceration. Right hand: She exhibits tenderness and swelling. She exhibits normal range of motion, no bony tenderness, normal two-point discrimination, normal capillary refill, no deformity and no laceration. Normal sensation noted. Normal strength noted. Hands:  Neurological: She is alert. Skin: She is not diaphoretic. Psychiatric: She has a normal mood and affect. Her behavior is normal. Judgment and thought content normal.   Nursing note and vitals reviewed. MDM    ICD-10-CM ICD-9-CM    1. Right hand pain M79.641 729.5 THUMB SPICA     Medications Ordered Today   Medications    naproxen (NAPROSYN) 500 mg tablet     Sig: Take 1 Tab by mouth every twelve (12) hours as needed for Pain. Dispense:  30 Tab     Refill:  0       The patients condition was discussed with the patient and they understand. The patient is to follow up with PCP. If signs and symptoms become worse the pt is to go to the ER. The patient is to take medications as prescribed.              Procedures

## 2019-03-22 ENCOUNTER — OFFICE VISIT (OUTPATIENT)
Dept: URGENT CARE | Age: 19
End: 2019-03-22

## 2019-03-22 ENCOUNTER — HOSPITAL ENCOUNTER (EMERGENCY)
Age: 19
Discharge: HOME | End: 2019-03-22
Attending: INTERNAL MEDICINE
Payer: COMMERCIAL

## 2019-03-22 VITALS
BODY MASS INDEX: 36.25 KG/M2 | WEIGHT: 197 LBS | TEMPERATURE: 97.2 F | SYSTOLIC BLOOD PRESSURE: 135 MMHG | DIASTOLIC BLOOD PRESSURE: 70 MMHG | RESPIRATION RATE: 16 BRPM | OXYGEN SATURATION: 100 % | HEART RATE: 84 BPM | HEIGHT: 62 IN

## 2019-03-22 VITALS
OXYGEN SATURATION: 100 % | WEIGHT: 191.8 LBS | BODY MASS INDEX: 35.08 KG/M2 | HEART RATE: 77 BPM | TEMPERATURE: 97.8 F | DIASTOLIC BLOOD PRESSURE: 87 MMHG | SYSTOLIC BLOOD PRESSURE: 146 MMHG | RESPIRATION RATE: 16 BRPM

## 2019-03-22 DIAGNOSIS — M00.869 BACTERIAL INFECTION OF KNEE JOINT (HCC): Primary | ICD-10-CM

## 2019-03-22 DIAGNOSIS — S89.92XD INJURY OF LEFT KNEE, SUBSEQUENT ENCOUNTER: Primary | ICD-10-CM

## 2019-03-22 PROCEDURE — 99283 EMERGENCY DEPT VISIT LOW MDM: CPT

## 2019-03-22 PROCEDURE — 74011250637 HC RX REV CODE- 250/637: Performed by: EMERGENCY MEDICINE

## 2019-03-22 RX ORDER — ACETAMINOPHEN 325 MG/1
650 TABLET ORAL
Status: COMPLETED | OUTPATIENT
Start: 2019-03-22 | End: 2019-03-22

## 2019-03-22 RX ORDER — CLINDAMYCIN HYDROCHLORIDE 300 MG/1
300 CAPSULE ORAL 3 TIMES DAILY
Qty: 21 CAP | Refills: 0 | Status: SHIPPED | OUTPATIENT
Start: 2019-03-22 | End: 2019-03-29

## 2019-03-22 RX ORDER — LEVOFLOXACIN 750 MG/1
750 TABLET ORAL DAILY
Qty: 5 TAB | Refills: 0 | Status: SHIPPED | OUTPATIENT
Start: 2019-03-22 | End: 2019-03-27

## 2019-03-22 RX ADMIN — ACETAMINOPHEN 650 MG: 325 TABLET ORAL at 21:06
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
## 2019-03-22 NOTE — PROGRESS NOTES
HPI     Past Medical History:   Diagnosis Date    Abdominal pain, other specified site 3/3/2015    Asthma     Premature infant         History reviewed. No pertinent surgical history. Family History   Problem Relation Age of Onset    Hypertension Father     Cancer Maternal Grandmother     Stroke Maternal Grandfather     Hypertension Paternal Grandmother     Hypertension Paternal Grandfather         Social History     Socioeconomic History    Marital status: SINGLE     Spouse name: Not on file    Number of children: Not on file    Years of education: Not on file    Highest education level: Not on file   Occupational History    Not on file   Social Needs    Financial resource strain: Not on file    Food insecurity:     Worry: Not on file     Inability: Not on file    Transportation needs:     Medical: Not on file     Non-medical: Not on file   Tobacco Use    Smoking status: Never Smoker    Smokeless tobacco: Never Used   Substance and Sexual Activity    Alcohol use: No    Drug use: No    Sexual activity: Never   Lifestyle    Physical activity:     Days per week: Not on file     Minutes per session: Not on file    Stress: Not on file   Relationships    Social connections:     Talks on phone: Not on file     Gets together: Not on file     Attends Faith service: Not on file     Active member of club or organization: Not on file     Attends meetings of clubs or organizations: Not on file     Relationship status: Not on file    Intimate partner violence:     Fear of current or ex partner: Not on file     Emotionally abused: Not on file     Physically abused: Not on file     Forced sexual activity: Not on file   Other Topics Concern    Not on file   Social History Narrative    Not on file                ALLERGIES: Tamiflu [oseltamivir]    Review of Systems    There were no vitals filed for this visit.     Physical Exam    MDM    Procedures
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
## 2019-03-25 ENCOUNTER — PATIENT OUTREACH (OUTPATIENT)
Dept: OTHER | Age: 19
End: 2019-03-25

## 2019-03-25 NOTE — PROGRESS NOTES
Initial HPRP:  
Patient on report as discharged from [facility] ED Visit 3/22/19 for Injury of Left Knee. Initial attempt to contact patient for transitions of care.  Left discreet message on voicemail with this Care Coordinator's contact information.  Will attempt outreach on 3/26/19. 
   
clindamycin 300 mg capsule 
 levoFLOXacin 750 mg tablet Call 911 anytime you think you may need emergency care. For example, call if:  
You have symptoms of a blood clot in your lung (called a pulmonary embolism). These may 
include: 
? Sudden chest pain. ? Trouble breathing. ? Coughing up blood. Call your doctor now or seek immediate medical care if: 
You have severe or increasing pain.  Your leg or foot turns cold or changes color.  You cannot stand or put weight on your knee.  Your knee looks twisted or bent out of shape.  You cannot move your knee. You have bruises from a knee injury that last longer than 2 weeks.  You have signs of infection, such as: 
? Increased pain, swelling, warmth, or redness. ? Red streaks leading from the knee. ? Pus draining from a place on your knee. ? A fever.  You have signs of a blood clot in your leg (called a deep vein thrombosis), such as: 
? Pain in your calf, back of the knee, thigh, or groin. ? Redness and swelling in your leg or groin.

## 2019-03-26 ENCOUNTER — PATIENT OUTREACH (OUTPATIENT)
Dept: OTHER | Age: 19
End: 2019-03-26

## 2019-03-26 NOTE — LETTER
3/26/2019 9:09 AM 
 
Ms. Yenny Rodrigues 19 [phone] Dear Yenny Villavicencio My name is Felipe Amezcua, Employee Care Coordinator for [employer] and I have been trying to reach you. The Employee Care Management [facility]) program is a free-of-charge confidential service provided to our employees and their family members covered by the [facility]. The program will provide an employee and his/her family with the [employer] expertise to assist in navigating the health care delivery system, provider services, and their overall care needsso as to assure and improve health care interactions and enhance the quality of life. This program is designed to provide you with the opportunity to have a [employer] care manager partner with you for the following services: 
 
 1) when you come home from the hospital or emergency room 2) when help is needed to manage your disease 3) when you need assistance coordinating services or appointments [facility] now partners with [facility]. If you are a qualifying employee, you may receive an additional 10 wellness incentive points for every month of active participation with an Employee Care Manager. [employer] is dedicated to empowering the good health of its community and improving the quality of care and care experiences for employees and their families. We are committed to safeguarding patient confidentiality and privacy, assuring that every employee has the respect he or she deserves in managing their health. The information shared with your care manager will not be shared with anyone else aside from those you identify as part of your care team, and will only be used to assist you with any identified care needs. Please contact me if you would like this service provided to you. Sincerely, 
 
Fabricio Tran LPN  [facility] Care Coordinator [address] [place] [address], [address] [place] [address] [address] [facility] C [phone]  F [phone]  [email or web address]

## 2019-03-26 NOTE — PROGRESS NOTES
Patient identified as eligible for [address] [place] [address] services. Second telephone outreach attempted. Left discreet voicemail with this CM confidential contact information. Will send UTR letter via Mail. Next Outreach 4/11/19 f/u - Injury of Left Knee

## 2019-04-11 ENCOUNTER — PATIENT OUTREACH (OUTPATIENT)
Dept: OTHER | Age: 19
End: 2019-04-11

## 2019-04-11 NOTE — PROGRESS NOTES
HPRP f/u: 
Telephone attempt to contact patient for Health Promotion and Risk Prevention. Left discreet message on voicemail with this CC contact information. Will follow for one month for transitions of care needs. Next outreach is 5/1/19 for discussion  f/u - Injury of Left Knee and Resolve Episode.

## 2019-05-01 ENCOUNTER — PATIENT OUTREACH (OUTPATIENT)
Dept: OTHER | Age: 19
End: 2019-05-01

## 2019-05-01 NOTE — PROGRESS NOTES
Final call made today to attempt to contact patient. Discreet message left on voicemail with contact information. Resolving current episode for case management due to patient unable to reach. Patient has not been reached after repeated calls and letters. Letter sent to patient notifying completion of services due to unable to reach. This writer's contact information and information regarding program services included in materials sent.

## 2019-05-01 NOTE — LETTER
5/1/2019 9:09 AM 
 
Ms. Christina Rodrigues 19 [phone] Dear Christina Warren My name is Samm Andersen,  Employee Care Coordinator for [employer], and I have been trying to reach you. The Employee Care Management [facility]) program is a free-of-charge, confidential service provided to our employees and their family members covered by the [facility]. I can help you with care transitions such as when you come home from the hospital, when help is needed to manage your disease, or when you need assistance coordinating services or appointments. [facility] now partners with [facility]. If you are a qualifying employee, you may receive an additional 10 wellness incentive points for every month of active participation with an Employee Care Manager. As healthcare providers, we know that patients do better when they have close follow up with a primary care provider (PCP). I can help you find one that is convenient to you and covered by your insurance. I can also help you understand any after visit instructions, such as what symptoms to watch out for, or any new or changed medications. We can work together using your preferred communication -- telephone, email, Stor[employer]hart. If you do not have a [employer] account, I can help you request access. Our program is designed to provide you with the opportunity to have a [employer] care manager partner with you for your healthcare needs. Due to not being able to reach you, I am closing out the current program, but will remain available to you should you have any questions. Please contact me at the below number if I can provide you with assistance for any of the above services. Sincerely, 
 
Mayra Abraham LPN  [facility] Care Coordinator [address] [place] [address] [address] [facility] [phone]  F [phone]  [email or web address]

## 2019-06-19 ENCOUNTER — OFFICE VISIT (OUTPATIENT)
Dept: URGENT CARE | Age: 19
End: 2019-06-19

## 2019-06-19 VITALS
TEMPERATURE: 99 F | SYSTOLIC BLOOD PRESSURE: 117 MMHG | HEIGHT: 62 IN | HEART RATE: 78 BPM | OXYGEN SATURATION: 98 % | WEIGHT: 183 LBS | BODY MASS INDEX: 33.68 KG/M2 | RESPIRATION RATE: 18 BRPM | DIASTOLIC BLOOD PRESSURE: 68 MMHG

## 2019-06-19 DIAGNOSIS — K52.9 COLITIS, ACUTE: Primary | ICD-10-CM

## 2019-06-19 LAB
BILIRUB UR QL STRIP: NEGATIVE
GLUCOSE UR-MCNC: NEGATIVE MG/DL
HCG URINE, QL. (POC): NEGATIVE
KETONES P FAST UR STRIP-MCNC: NEGATIVE MG/DL
PH UR STRIP: 5.5 [PH] (ref 4.6–8)
PROT UR QL STRIP: NEGATIVE
SP GR UR STRIP: 1.03 (ref 1–1.03)
UA UROBILINOGEN AMB POC: NORMAL (ref 0.2–1)
URINALYSIS CLARITY POC: NORMAL
URINALYSIS COLOR POC: NORMAL
URINE BLOOD POC: NORMAL
URINE LEUKOCYTES POC: NEGATIVE
URINE NITRITES POC: NEGATIVE
VALID INTERNAL CONTROL?: YES

## 2019-06-19 RX ORDER — METRONIDAZOLE 250 MG/1
250 TABLET ORAL 3 TIMES DAILY
Qty: 21 TAB | Refills: 0 | Status: SHIPPED | OUTPATIENT
Start: 2019-06-19 | End: 2019-06-26

## 2019-06-19 RX ORDER — ONDANSETRON 4 MG/1
4 TABLET, ORALLY DISINTEGRATING ORAL
Qty: 10 TAB | Refills: 0 | Status: SHIPPED | OUTPATIENT
Start: 2019-06-19 | End: 2019-11-07

## 2019-06-19 RX ORDER — DICYCLOMINE HYDROCHLORIDE 20 MG/1
20 TABLET ORAL EVERY 6 HOURS
Qty: 12 TAB | Refills: 0 | Status: SHIPPED | OUTPATIENT
Start: 2019-06-19 | End: 2019-11-07

## 2019-06-19 NOTE — PATIENT INSTRUCTIONS
Colitis: Care Instructions  Your Care Instructions  Colitis is the medical term for swelling (inflammation) of the intestine. It can be caused by different things, such as an infection or loss of blood flow in the intestine. Other causes are problems like Crohn's disease or ulcerative colitis. Symptoms may include fever, diarrhea that may be bloody, or belly pain. Sometimes symptoms go away without treatment. But you may need treatment or more tests, such as blood tests or a stool test. Or you may need imaging tests like a CT scan or a colonoscopy. In some cases, the doctor may want to test a sample of tissue from the intestine. This test is called a biopsy. The doctor has checked you carefully, but problems can develop later. If you notice any problems or new symptoms, get medical treatment right away. Follow-up care is a key part of your treatment and safety. Be sure to make and go to all appointments, and call your doctor if you are having problems. It's also a good idea to know your test results and keep a list of the medicines you take. How can you care for yourself at home? · Rest until you feel better. · Your doctor may recommend that you eat bland foods. These include rice, dry toast or crackers, bananas, and applesauce. · To prevent dehydration, drink plenty of fluids. Choose water and other caffeine-free clear liquids until you feel better. If you have kidney, heart, or liver disease and have to limit fluids, talk with your doctor before you increase the amount of fluids you drink. · Be safe with medicines. Take your medicines exactly as prescribed. Call your doctor if you think you are having a problem with your medicine. You will get more details on the specific medicines your doctor prescribes. When should you call for help? Call 911 anytime you think you may need emergency care. For example, call if:    · You passed out (lost consciousness).     · Your stools are maroon or very bloody.  Call your doctor now or seek immediate medical care if:    · You have new or worse belly pain.     · You have a fever.     · You are vomiting.     · You cannot pass stools or gas.     · You have new or more blood in your stools.    Watch closely for changes in your health, and be sure to contact your doctor if:    · You have new or worse symptoms.     · You are losing weight.     · You do not get better as expected. Where can you learn more? Go to http://mason-rosario.info/. German Thomas in the search box to learn more about \"Colitis: Care Instructions. \"  Current as of: March 27, 2018  Content Version: 11.9  © 9643-2881 [employer]. Care instructions adapted under license by [employer] (which disclaims liability or warranty for this information). If you have questions about a medical condition or this instruction, always ask your healthcare professional. [address] any warranty or liability for your use of this information.

## 2019-06-19 NOTE — PROGRESS NOTES
Diarrhea    The history is provided by the patient. This is a new problem. The current episode started 2 days ago. The problem occurs more than 10 times per day. The problem has not changed since onset. There has been no fever. Associated symptoms include abdominal pain (diffuse lower abdominal cramping ). Pertinent negatives include no vomiting, no chills, no sweats, no anal bleeding and no back pain. Risk factors include suspect food intake. She has tried nothing for the symptoms. Her past medical history does not include irritable bowel syndrome. Past Medical History:   Diagnosis Date    Abdominal pain, other specified site 3/3/2015    Asthma     Premature infant         History reviewed. No pertinent surgical history.       Family History   Problem Relation Age of Onset    Hypertension Father     Cancer Maternal Grandmother     Stroke Maternal Grandfather     Hypertension Paternal Grandmother     Hypertension Paternal Grandfather         Social History     Socioeconomic History    Marital status: SINGLE     Spouse name: Not on file    Number of children: Not on file    Years of education: Not on file    Highest education level: Not on file   Occupational History    Not on file   Social Needs    Financial resource strain: Not on file    Food insecurity:     Worry: Not on file     Inability: Not on file    Transportation needs:     Medical: Not on file     Non-medical: Not on file   Tobacco Use    Smoking status: Never Smoker    Smokeless tobacco: Never Used   Substance and Sexual Activity    Alcohol use: No    Drug use: No    Sexual activity: Never   Lifestyle    Physical activity:     Days per week: Not on file     Minutes per session: Not on file    Stress: Not on file   Relationships    Social connections:     Talks on phone: Not on file     Gets together: Not on file     Attends [employer] service: Not on file     Active member of club or organization: Not on file     Attends meetings of clubs or organizations: Not on file     Relationship status: Not on file    Intimate partner violence:     Fear of current or ex partner: Not on file     Emotionally abused: Not on file     Physically abused: Not on file     Forced sexual activity: Not on file   Other Topics Concern    Not on file   Social History Narrative    Not on file                ALLERGIES: Tamiflu [oseltamivir]    Review of Systems   Constitutional: Negative for chills. Gastrointestinal: Positive for abdominal pain (diffuse lower abdominal cramping ), diarrhea and nausea. Negative for anal bleeding and vomiting. Musculoskeletal: Negative for back pain. All other systems reviewed and are negative. Vitals:    06/19/19 0937   BP: 117/68   Pulse: 78   Resp: 18   Temp: 99 °F (37.2 °C)   SpO2: 98%   Weight: 183 lb (83 kg)   Height: 5' 2\" (1.575 m)       Physical Exam   Constitutional: No distress. HENT:   Right Ear: Tympanic membrane and ear canal normal.   Left Ear: Tympanic membrane and ear canal normal.   Nose: Nose normal.   Mouth/Throat: No oropharyngeal exudate, posterior oropharyngeal edema or posterior oropharyngeal erythema. Eyes: Conjunctivae are normal. Right eye exhibits no discharge. Left eye exhibits no discharge. Neck: Neck supple. Pulmonary/Chest: Effort normal and breath sounds normal. No respiratory distress. She has no wheezes. She has no rales. Abdominal: Soft. There is tenderness (diffuse in lower abdomen ). There is no rebound and no guarding. Lymphadenopathy:     She has no cervical adenopathy. Skin: No rash noted. Nursing note and vitals reviewed. MDM    Procedures        ICD-10-CM ICD-9-CM    1. Colitis, acute K52.9 558.9 AMB POC URINALYSIS DIP STICK AUTO W/O MICRO      AMB POC URINE PREGNANCY TEST, VISUAL COLOR COMPARISON     Medications Ordered Today   Medications    metroNIDAZOLE (FLAGYL) 250 mg tablet     Sig: Take 1 Tab by mouth three (3) times daily for 7 days. Dispense:  21 Tab     Refill:  0    dicyclomine (BENTYL) 20 mg tablet     Sig: Take 1 Tab by mouth every six (6) hours. Dispense:  12 Tab     Refill:  0    ondansetron (ZOFRAN ODT) 4 mg disintegrating tablet     Sig: Take 1 Tab by mouth every eight (8) hours as needed for Nausea. Dispense:  10 Tab     Refill:  0     Results for orders placed or performed in visit on 06/19/19   AMB POC URINALYSIS DIP STICK AUTO W/O MICRO   Result Value Ref Range    Color (UA POC)      Clarity (UA POC)      Glucose (UA POC) Negative Negative    Bilirubin (UA POC) Negative Negative    Ketones (UA POC) Negative Negative    Specific gravity (UA POC) 1.030 1.001 - 1.035    Blood (UA POC) Trace Negative    pH (UA POC) 5.5 4.6 - 8.0    Protein (UA POC) Negative Negative    Urobilinogen (UA POC) 0.2 mg/dL 0.2 - 1    Nitrites (UA POC) Negative Negative    Leukocyte esterase (UA POC) Negative Negative   AMB POC URINE PREGNANCY TEST, VISUAL COLOR COMPARISON   Result Value Ref Range    VALID INTERNAL CONTROL POC Yes     HCG urine, Ql. (POC) Negative Negative     The patients condition was discussed with the patient and they understand. The patient is to follow up with primary care doctor. If signs and symptoms become worse the pt is to go to the ER. The patient is to take medications as prescribed.

## 2019-11-07 ENCOUNTER — OFFICE VISIT (OUTPATIENT)
Dept: URGENT CARE | Age: 19
End: 2019-11-07

## 2019-11-07 VITALS
HEIGHT: 61 IN | WEIGHT: 198 LBS | TEMPERATURE: 97.9 F | BODY MASS INDEX: 37.38 KG/M2 | DIASTOLIC BLOOD PRESSURE: 64 MMHG | OXYGEN SATURATION: 99 % | SYSTOLIC BLOOD PRESSURE: 116 MMHG | HEART RATE: 69 BPM | RESPIRATION RATE: 17 BRPM

## 2019-11-07 DIAGNOSIS — B34.9 VIRAL ILLNESS: Primary | ICD-10-CM

## 2019-11-07 DIAGNOSIS — R68.83 CHILLS: ICD-10-CM

## 2019-11-07 DIAGNOSIS — R11.2 NAUSEA AND VOMITING, INTRACTABILITY OF VOMITING NOT SPECIFIED, UNSPECIFIED VOMITING TYPE: ICD-10-CM

## 2019-11-07 LAB
FLUAV+FLUBV AG NOSE QL IA.RAPID: NEGATIVE POS/NEG
FLUAV+FLUBV AG NOSE QL IA.RAPID: NEGATIVE POS/NEG
VALID INTERNAL CONTROL?: YES

## 2019-11-07 RX ORDER — ONDANSETRON 4 MG/1
4 TABLET, ORALLY DISINTEGRATING ORAL
Qty: 1 TAB | Refills: 0 | Status: SHIPPED | COMMUNITY
Start: 2019-11-07 | End: 2019-11-07

## 2019-11-07 NOTE — PATIENT INSTRUCTIONS

## 2019-11-07 NOTE — LETTER
[address] [facility] 
[address] 
[place] [address] 
[phone] Work/School Note Date: 11/7/2019 To Whom It May concern: 
 
Mino Godinez was seen and treated today in the urgent care center. Mino Godinez may return to school on 11/11/2019. Sincerely, Leona Chase MD

## 2019-11-07 NOTE — PROGRESS NOTES
Headache    The history is provided by the patient. This is a new problem. Episode onset: 4 days ago. The problem occurs constantly. The problem has been gradually improving. The headache is aggravated by nausea and vomiting. The pain is located in the generalized region. The pain is at a severity of 5/10. Associated symptoms include nausea and vomiting. Pertinent negatives include no anorexia, no fever, no chest pressure, no palpitations, no shortness of breath, no weakness and no dizziness. Past Medical History:   Diagnosis Date    Abdominal pain, other specified site 3/3/2015    Asthma     Premature infant         History reviewed. No pertinent surgical history.       Family History   Problem Relation Age of Onset    Hypertension Father     Cancer Maternal Grandmother     Stroke Maternal Grandfather     Hypertension Paternal Grandmother     Hypertension Paternal Grandfather         Social History     Socioeconomic History    Marital status: SINGLE     Spouse name: Not on file    Number of children: Not on file    Years of education: Not on file    Highest education level: Not on file   Occupational History    Not on file   Social Needs    Financial resource strain: Not on file    Food insecurity:     Worry: Not on file     Inability: Not on file    Transportation needs:     Medical: Not on file     Non-medical: Not on file   Tobacco Use    Smoking status: Never Smoker    Smokeless tobacco: Never Used   Substance and Sexual Activity    Alcohol use: No    Drug use: No    Sexual activity: Never   Lifestyle    Physical activity:     Days per week: Not on file     Minutes per session: Not on file    Stress: Not on file   Relationships    Social connections:     Talks on phone: Not on file     Gets together: Not on file     Attends Sikhism service: Not on file     Active member of club or organization: Not on file     Attends meetings of clubs or organizations: Not on file Relationship status: Not on file    Intimate partner violence:     Fear of current or ex partner: Not on file     Emotionally abused: Not on file     Physically abused: Not on file     Forced sexual activity: Not on file   Other Topics Concern    Not on file   Social History Narrative    Not on file                ALLERGIES: Tamiflu [oseltamivir]    Review of Systems   Constitutional: Positive for chills. Negative for activity change, appetite change and fever. HENT: Positive for congestion and rhinorrhea. Negative for ear pain, sinus pressure, sinus pain, sore throat and trouble swallowing. Respiratory: Positive for cough. Negative for shortness of breath and wheezing. Cardiovascular: Negative for chest pain and palpitations. Gastrointestinal: Positive for nausea and vomiting. Negative for abdominal pain and anorexia. Musculoskeletal: Negative for myalgias. Neurological: Positive for headaches. Negative for dizziness and weakness. Hematological: Negative for adenopathy. Vitals:    11/07/19 1209   BP: 116/64   Pulse: 69   Resp: 17   Temp: 97.9 °F (36.6 °C)   SpO2: 99%   Weight: 198 lb (89.8 kg)   Height: 5' 1\" (1.549 m)       Physical Exam   Constitutional: She appears well-developed and well-nourished. No distress. HENT:   Right Ear: External ear and ear canal normal. Tympanic membrane is not erythematous and not bulging. Left Ear: External ear and ear canal normal. Tympanic membrane is not erythematous and not bulging. Nose: Rhinorrhea present. Right sinus exhibits no maxillary sinus tenderness and no frontal sinus tenderness. Left sinus exhibits no maxillary sinus tenderness and no frontal sinus tenderness. Mouth/Throat: Oropharynx is clear and moist and mucous membranes are normal. No oropharyngeal exudate, posterior oropharyngeal edema, posterior oropharyngeal erythema or tonsillar abscesses. Cardiovascular: Normal rate, regular rhythm and normal heart sounds. Pulmonary/Chest: Effort normal and breath sounds normal. No respiratory distress. She has no wheezes. She has no rales. Abdominal: Soft. Bowel sounds are normal. She exhibits no distension. There is generalized tenderness. There is no rigidity, no rebound and no guarding. Lymphadenopathy:     She has cervical adenopathy. Neurological: She is alert. Skin: She is not diaphoretic. Psychiatric: She has a normal mood and affect. Her behavior is normal. Judgment and thought content normal.   Nursing note and vitals reviewed. [facility]    ICD-10-CM ICD-9-CM   1. Viral illness B34.9 079.99   2. Chills R68.83 780.64   3. Nausea and vomiting, intractability of vomiting not specified, unspecified vomiting type R11.2 787.01       Orders Placed This Encounter    AMB POC MADY INFLUENZA A/B TEST    ondansetron (ZOFRAN ODT) 4 mg disintegrating tablet     Sig: Take 1 Tab by mouth now for 1 dose. Dispense:  1 Tab     Refill:  0     Order Specific Question:   Expiration Date     Answer:   9/30/2021     Order Specific Question:   Lot#     Answer:   [id number]     Order Specific Question:        Answer:   Titus Escamilla     Order Specific Question:   NDC#     Answer:   [id number]      Increase fluids  Tylenol/motrin prn    The patient is to follow up with PCP [facility]. If signs and symptoms become worse the pt is to go to the ER.        Results for orders placed or performed in visit on 11/07/19   AMB POC MADY INFLUENZA A/B TEST   Result Value Ref Range    VALID INTERNAL CONTROL POC Yes     Influenza A Ag POC Negative Negative Pos/Neg    Influenza B Ag POC Negative Negative Pos/Neg         Procedures

## 2020-01-24 ENCOUNTER — OFFICE VISIT (OUTPATIENT)
Dept: URGENT CARE | Age: 20
End: 2020-01-24

## 2020-01-24 VITALS
SYSTOLIC BLOOD PRESSURE: 123 MMHG | TEMPERATURE: 97.8 F | HEART RATE: 96 BPM | WEIGHT: 195 LBS | DIASTOLIC BLOOD PRESSURE: 58 MMHG | RESPIRATION RATE: 18 BRPM | BODY MASS INDEX: 35.88 KG/M2 | HEIGHT: 62 IN | OXYGEN SATURATION: 98 %

## 2020-01-24 DIAGNOSIS — K52.9 GASTROENTERITIS: ICD-10-CM

## 2020-01-24 DIAGNOSIS — R10.84 GENERALIZED ABDOMINAL PAIN: ICD-10-CM

## 2020-01-24 LAB
BILIRUB UR QL STRIP: NEGATIVE
GLUCOSE UR-MCNC: NEGATIVE MG/DL
HCG URINE, QL. (POC): NEGATIVE
KETONES P FAST UR STRIP-MCNC: NEGATIVE MG/DL
PH UR STRIP: 6 [PH] (ref 4.6–8)
PROT UR QL STRIP: NEGATIVE
SP GR UR STRIP: 1.03 (ref 1–1.03)
UA UROBILINOGEN AMB POC: NORMAL (ref 0.2–1)
URINALYSIS CLARITY POC: NORMAL
URINALYSIS COLOR POC: NORMAL
URINE BLOOD POC: NEGATIVE
URINE LEUKOCYTES POC: NEGATIVE
URINE NITRITES POC: NEGATIVE
VALID INTERNAL CONTROL?: YES

## 2020-01-24 NOTE — LETTER
[address] 
[place] [address] 
[phone] Work/School Note Date: 1/24/2020 To Whom It May concern: 
 
Eric Bhakta was seen and treated today in the urgent care center by the following:  Albert Morrissey DNP Eric Bhakta may return to work on 1/27/2020 or sooner if feeling better. Sincerely, Albert Morrissey NP

## 2020-01-24 NOTE — PROGRESS NOTES
The history is provided by the patient. Abdominal Pain    The history is provided by the patient. This is a new problem. The current episode started 2 days ago. The problem occurs constantly. The problem has not changed since onset. Associated with: vomiting x3 times with diarrhea. The pain is at a severity of 2/10. The pain is mild. Associated symptoms include diarrhea and vomiting. Pertinent negatives include no fever, no belching, no flatus, no nausea, no constipation, no chest pain and no back pain. Associated symptoms comments: Abdominal cramping when diarrhea start. Nothing worsens the pain. Past Medical History:   Diagnosis Date    Abdominal pain, other specified site 3/3/2015    Asthma     Premature infant         History reviewed. No pertinent surgical history.       Family History   Problem Relation Age of Onset    Hypertension Father     Cancer Maternal Grandmother     Stroke Maternal Grandfather     Hypertension Paternal Grandmother     Hypertension Paternal Grandfather         Social History     Socioeconomic History    Marital status: SINGLE     Spouse name: Not on file    Number of children: Not on file    Years of education: Not on file    Highest education level: Not on file   Occupational History    Not on file   Social Needs    Financial resource strain: Not on file    Food insecurity:     Worry: Not on file     Inability: Not on file    Transportation needs:     Medical: Not on file     Non-medical: Not on file   Tobacco Use    Smoking status: Never Smoker    Smokeless tobacco: Never Used   Substance and Sexual Activity    Alcohol use: No    Drug use: No    Sexual activity: Never   Lifestyle    Physical activity:     Days per week: Not on file     Minutes per session: Not on file    Stress: Not on file   Relationships    Social connections:     Talks on phone: Not on file     Gets together: Not on file     Attends Yazidism service: Not on file     Active member of club or organization: Not on file     Attends meetings of clubs or organizations: Not on file     Relationship status: Not on file    Intimate partner violence:     Fear of current or ex partner: Not on file     Emotionally abused: Not on file     Physically abused: Not on file     Forced sexual activity: Not on file   Other Topics Concern    Not on file   Social History Narrative    Not on file                ALLERGIES: Tamiflu [oseltamivir]    Review of Systems   Constitutional: Positive for fatigue. Negative for chills and fever. HENT: Negative. Respiratory: Negative for cough, chest tightness and shortness of breath. Cardiovascular: Negative for chest pain. Gastrointestinal: Positive for abdominal pain, diarrhea and vomiting. Negative for abdominal distention, constipation, flatus and nausea. Genitourinary: Negative. Musculoskeletal: Negative for back pain. Skin: Negative. Neurological: Negative. Vitals:    01/24/20 1514   BP: 123/58   Pulse: 96   Resp: 18   Temp: 97.8 °F (36.6 °C)   SpO2: 98%   Weight: 195 lb (88.5 kg)   Height: 5' 2\" (1.575 m)       Physical Exam  Constitutional:       Appearance: Normal appearance. She is well-developed. HENT:      Right Ear: Tympanic membrane normal.      Left Ear: Tympanic membrane normal.      Nose: Nose normal.      Mouth/Throat:      Mouth: Mucous membranes are moist.      Pharynx: No posterior oropharyngeal erythema. Eyes:      Conjunctiva/sclera: Conjunctivae normal.      Pupils: Pupils are equal, round, and reactive to light. Neck:      Musculoskeletal: Normal range of motion. Cardiovascular:      Rate and Rhythm: Normal rate and regular rhythm. Heart sounds: Normal heart sounds. Pulmonary:      Effort: Pulmonary effort is normal.      Breath sounds: Normal breath sounds. Abdominal:      General: Bowel sounds are normal.      Palpations: Abdomen is soft. Tenderness: There is tenderness (generalized ).  There is no guarding or rebound. Musculoskeletal: Normal range of motion. Lymphadenopathy:      Cervical: No cervical adenopathy. Skin:     General: Skin is warm and dry. Neurological:      Mental Status: She is alert and oriented to person, place, and time. MDM     Differential Diagnosis; Clinical Impression; Plan:     (K52.9) Gastroenteritis  (R10.84) Generalized abdominal pain  No orders of the defined types were placed in this encounter. Advised to stay hydrated and drink sport drinks. Education given. The patients condition was discussed with the patient and they understand. The patient is to follow up with PCP. If signs and symptoms become worse the pt is to go to the ER. AVS given with patient instructions upon discharge.                   Procedures

## 2021-11-03 ENCOUNTER — HOSPITAL ENCOUNTER (EMERGENCY)
Age: 21
Discharge: HOME | End: 2021-11-03
Attending: EMERGENCY MEDICINE
Payer: COMMERCIAL

## 2021-11-03 VITALS
HEART RATE: 89 BPM | WEIGHT: 207.01 LBS | DIASTOLIC BLOOD PRESSURE: 79 MMHG | OXYGEN SATURATION: 98 % | BODY MASS INDEX: 37.86 KG/M2 | TEMPERATURE: 98 F | RESPIRATION RATE: 16 BRPM | SYSTOLIC BLOOD PRESSURE: 135 MMHG

## 2021-11-03 DIAGNOSIS — K52.9 GASTROENTERITIS, ACUTE: Primary | ICD-10-CM

## 2021-11-03 LAB
ALBUMIN SERPL-MCNC: 3.8 G/DL (ref 3.5–5)
ALBUMIN/GLOB SERPL: 0.9 {RATIO} (ref 1.1–2.2)
ALP SERPL-CCNC: 56 U/L (ref 45–117)
ALT SERPL-CCNC: 28 U/L (ref 12–78)
ANION GAP SERPL CALC-SCNC: 11 MMOL/L (ref 5–15)
APPEARANCE UR: ABNORMAL
AST SERPL-CCNC: 16 U/L (ref 15–37)
BACTERIA URNS QL MICRO: ABNORMAL /HPF
BASOPHILS # BLD: 0.1 K/UL (ref 0–0.1)
BASOPHILS NFR BLD: 1 % (ref 0–1)
BILIRUB SERPL-MCNC: 0.3 MG/DL (ref 0.2–1)
BILIRUB UR QL: NEGATIVE
BUN SERPL-MCNC: 15 MG/DL (ref 6–20)
BUN/CREAT SERPL: 19 (ref 12–20)
CALCIUM SERPL-MCNC: 9.1 MG/DL (ref 8.5–10.1)
CHLORIDE SERPL-SCNC: 101 MMOL/L (ref 97–108)
CO2 SERPL-SCNC: 28 MMOL/L (ref 21–32)
COLOR UR: ABNORMAL
CREAT SERPL-MCNC: 0.78 MG/DL (ref 0.55–1.02)
DIFFERENTIAL METHOD BLD: ABNORMAL
EOSINOPHIL # BLD: 0.4 K/UL (ref 0–0.4)
EOSINOPHIL NFR BLD: 4 % (ref 0–7)
EPITH CASTS URNS QL MICRO: ABNORMAL /LPF
ERYTHROCYTE [DISTWIDTH] IN BLOOD BY AUTOMATED COUNT: 12.6 % (ref 11.5–14.5)
GLOBULIN SER CALC-MCNC: 4.2 G/DL (ref 2–4)
GLUCOSE SERPL-MCNC: 90 MG/DL (ref 65–100)
GLUCOSE UR STRIP.AUTO-MCNC: NEGATIVE MG/DL
HCG UR QL: NEGATIVE
HCT VFR BLD AUTO: 38.2 % (ref 35–47)
HGB BLD-MCNC: 12.5 G/DL (ref 11.5–16)
HGB UR QL STRIP: NEGATIVE
IMM GRANULOCYTES # BLD AUTO: 0 K/UL (ref 0–0.04)
IMM GRANULOCYTES NFR BLD AUTO: 0 % (ref 0–0.5)
KETONES UR QL STRIP.AUTO: ABNORMAL MG/DL
LEUKOCYTE ESTERASE UR QL STRIP.AUTO: ABNORMAL
LIPASE SERPL-CCNC: 154 U/L (ref 73–393)
LYMPHOCYTES # BLD: 2 K/UL (ref 0.8–3.5)
LYMPHOCYTES NFR BLD: 17 % (ref 12–49)
MCH RBC QN AUTO: 27.7 PG (ref 26–34)
MCHC RBC AUTO-ENTMCNC: 32.7 G/DL (ref 30–36.5)
MCV RBC AUTO: 84.7 FL (ref 80–99)
MONOCYTES # BLD: 0.8 K/UL (ref 0–1)
MONOCYTES NFR BLD: 6 % (ref 5–13)
NEUTS SEG # BLD: 8.4 K/UL (ref 1.8–8)
NEUTS SEG NFR BLD: 72 % (ref 32–75)
NITRITE UR QL STRIP.AUTO: NEGATIVE
NRBC # BLD: 0 K/UL (ref 0–0.01)
NRBC BLD-RTO: 0 PER 100 WBC
PH UR STRIP: 5.5 [PH] (ref 5–8)
PLATELET # BLD AUTO: 271 K/UL (ref 150–400)
PMV BLD AUTO: 10.1 FL (ref 8.9–12.9)
POTASSIUM SERPL-SCNC: 4 MMOL/L (ref 3.5–5.1)
PROT SERPL-MCNC: 8 G/DL (ref 6.4–8.2)
PROT UR STRIP-MCNC: NEGATIVE MG/DL
RBC # BLD AUTO: 4.51 M/UL (ref 3.8–5.2)
RBC #/AREA URNS HPF: ABNORMAL /HPF (ref 0–5)
SODIUM SERPL-SCNC: 140 MMOL/L (ref 136–145)
SP GR UR REFRACTOMETRY: 1.02 (ref 1–1.03)
UR CULT HOLD, URHOLD: NORMAL
UROBILINOGEN UR QL STRIP.AUTO: 0.2 EU/DL (ref 0.2–1)
WBC # BLD AUTO: 11.7 K/UL (ref 3.6–11)
WBC URNS QL MICRO: ABNORMAL /HPF (ref 0–4)

## 2021-11-03 PROCEDURE — 36415 COLL VENOUS BLD VENIPUNCTURE: CPT

## 2021-11-03 PROCEDURE — 99284 EMERGENCY DEPT VISIT MOD MDM: CPT

## 2021-11-03 PROCEDURE — 87491 CHLMYD TRACH DNA AMP PROBE: CPT

## 2021-11-03 PROCEDURE — 74011250636 HC RX REV CODE- 250/636: Performed by: PHYSICIAN ASSISTANT

## 2021-11-03 PROCEDURE — 81001 URINALYSIS AUTO W/SCOPE: CPT

## 2021-11-03 PROCEDURE — 85025 COMPLETE CBC W/AUTO DIFF WBC: CPT

## 2021-11-03 PROCEDURE — 80053 COMPREHEN METABOLIC PANEL: CPT

## 2021-11-03 PROCEDURE — 96374 THER/PROPH/DIAG INJ IV PUSH: CPT

## 2021-11-03 PROCEDURE — 81025 URINE PREGNANCY TEST: CPT

## 2021-11-03 PROCEDURE — 96375 TX/PRO/DX INJ NEW DRUG ADDON: CPT

## 2021-11-03 PROCEDURE — 83690 ASSAY OF LIPASE: CPT

## 2021-11-03 PROCEDURE — 87086 URINE CULTURE/COLONY COUNT: CPT

## 2021-11-03 RX ORDER — ONDANSETRON 2 MG/ML
4 INJECTION INTRAMUSCULAR; INTRAVENOUS
Status: COMPLETED | OUTPATIENT
Start: 2021-11-03 | End: 2021-11-03

## 2021-11-03 RX ORDER — ONDANSETRON 4 MG/1
4 TABLET, ORALLY DISINTEGRATING ORAL
Qty: 10 TABLET | Refills: 0 | Status: SHIPPED | OUTPATIENT
Start: 2021-11-03

## 2021-11-03 RX ORDER — KETOROLAC TROMETHAMINE 30 MG/ML
15 INJECTION, SOLUTION INTRAMUSCULAR; INTRAVENOUS
Status: COMPLETED | OUTPATIENT
Start: 2021-11-03 | End: 2021-11-03

## 2021-11-03 RX ORDER — FAMOTIDINE 20 MG/1
20 TABLET, FILM COATED ORAL 2 TIMES DAILY
Qty: 14 TABLET | Refills: 0 | Status: SHIPPED | OUTPATIENT
Start: 2021-11-03

## 2021-11-03 RX ADMIN — ONDANSETRON 4 MG: 2 INJECTION INTRAMUSCULAR; INTRAVENOUS at 16:47

## 2021-11-03 RX ADMIN — KETOROLAC TROMETHAMINE 15 MG: 30 INJECTION, SOLUTION INTRAMUSCULAR; INTRAVENOUS at 16:46

## 2021-11-03 NOTE — ED PROVIDER NOTES
Patient is a 10-year-old female who presents ambulatory for evaluation of nausea, vomiting and diarrhea for the past 3 days, primary complaint is nausea. She states she was prescribed doxycycline 100 mg twice daily for 10 days for treatment of chlamydia, and has taken 7 days worth of the antibiotic prescribed by her extended hours PCP. She states at the time of diagnosis she was getting screened for STDs because her significant others ask let them know that she had gonorrhea, and patient and her boyfriend were found to be positive for chlamydia. Patient states she had some fullness in her pelvis but that was the only symptom at the visit and was prescribed doxycycline. She has taken doxycycline in the past without issue. She states 3 days ago she had a low-grade fever of 100.7, chills and began to have nausea, vomiting and diarrhea. She states the fever lasted 1 to 2 days and she has been fever free since 11/01. She has vomited 3 times today and had 2 loose stools today, both were nonbloody. She is denying fever, chills, cough, URI symptoms and denies abdominal pain only abdominal cramping. She specifically denies pelvic pain, vaginal discharge, vaginal odor. Primary complaint is nausea  She has been vaccinated against Covid, she does work in the hospital.    LMP2 weeks ago. She is on day 7 of her new OCP packet. Past Medical History:   Diagnosis Date    Abdominal pain, other specified site 3/3/2015    Asthma     Premature infant        History reviewed. No pertinent surgical history.       Family History:   Problem Relation Age of Onset    Hypertension Father     Cancer Maternal Grandmother     Stroke Maternal Grandfather     Hypertension Paternal Grandmother     Hypertension Paternal Grandfather        Social History     Socioeconomic History    Marital status: SINGLE     Spouse name: Not on file    Number of children: Not on file    Years of education: Not on file    Highest education level: Not on file   Occupational History    Not on file   Tobacco Use    Smoking status: Never Smoker    Smokeless tobacco: Never Used   Substance and Sexual Activity    Alcohol use: No    Drug use: No    Sexual activity: Never   Other Topics Concern    Not on file   Social History Narrative    Not on file     Social Determinants of Health     Financial Resource Strain:     Difficulty of Paying Living Expenses: Not on file   Food Insecurity:     Worried About Running Out of Food in the Last Year: Not on file    Magno of Food in the Last Year: Not on file   Transportation Needs:     Lack of Transportation (Medical): Not on file    Lack of Transportation (Non-Medical): Not on file   Physical Activity:     Days of Exercise per Week: Not on file    Minutes of Exercise per Session: Not on file   Stress:     Feeling of Stress : Not on file   Social Connections:     Frequency of Communication with Friends and Family: Not on file    Frequency of Social Gatherings with Friends and Family: Not on file    Attends Evangelical Services: Not on file    Active Member of [address] [place] [address] or Organizations: Not on file    Attends Club or Organization Meetings: Not on file    Marital Status: Not on file   Intimate Partner Violence:     Fear of Current or Ex-Partner: Not on file    Emotionally Abused: Not on file    Physically Abused: Not on file    Sexually Abused: Not on file   Housing Stability:     Unable to Pay for Housing in the Last Year: Not on file    Number of [place] in the Last Year: Not on file    Unstable Housing in the Last Year: Not on file         ALLERGIES: Tamiflu [oseltamivir]    Review of Systems   Constitutional: Negative. Negative for activity change, chills, fatigue and unexpected weight change. HENT: Negative for trouble swallowing. Respiratory: Negative for cough, chest tightness, shortness of breath and wheezing. Cardiovascular: Negative.   Negative for chest pain and palpitations. Gastrointestinal: Positive for diarrhea, nausea and vomiting. Negative for abdominal pain. Genitourinary: Negative. Negative for dysuria, flank pain, frequency and hematuria. Musculoskeletal: Negative. Negative for arthralgias, back pain, neck pain and neck stiffness. Skin: Negative. Negative for color change and rash. Neurological: Negative. Negative for dizziness, numbness and headaches. All other systems reviewed and are negative. Vitals:    11/03/21 1618   BP: (!) 142/86   Pulse: 91   Resp: 16   Temp: 98 °F (36.7 °C)   SpO2: 97%   Weight: 93.9 kg (207 lb 0.2 oz)            Physical Exam  Vitals and nursing note reviewed. Constitutional:       General: She is not in acute distress. Appearance: Normal appearance. She is well-developed. She is not toxic-appearing or diaphoretic. Comments: WF, elevated BMI   HENT:      Head: Normocephalic and atraumatic. Eyes:      General:         Right eye: No discharge. Left eye: No discharge. Conjunctiva/sclera: Conjunctivae normal.      Pupils: Pupils are equal, round, and reactive to light. Neck:      Trachea: No tracheal tenderness. Cardiovascular:      Rate and Rhythm: Normal rate and regular rhythm. Pulses: Normal pulses. Heart sounds: Normal heart sounds. No murmur heard. No friction rub. No gallop. Pulmonary:      Effort: Pulmonary effort is normal. No respiratory distress. Breath sounds: Normal breath sounds. No wheezing or rales. Chest:      Chest wall: No tenderness. Abdominal:      General: Bowel sounds are normal. There is no distension. Palpations: Abdomen is soft. Tenderness: There is no abdominal tenderness. There is no guarding or rebound. Musculoskeletal:         General: No tenderness. Normal range of motion. Cervical back: Full passive range of motion without pain and normal range of motion. Skin:     General: Skin is warm and dry.       Capillary Refill: Capillary refill takes less than 2 seconds. Findings: No abrasion, erythema or rash. Neurological:      Mental Status: She is alert and oriented to person, place, and time. Cranial Nerves: No cranial nerve deficit. Sensory: No sensory deficit. Coordination: Coordination normal.   Psychiatric:         Speech: Speech normal.         Behavior: Behavior normal.          MDM  Number of Diagnoses or Management Options  Diagnosis management comments:   DDx: Gastroenteritis, medication side effect, dehydration, pregnancy, pyelonephritis, UTI, STI, PID       Amount and/or Complexity of Data Reviewed  Clinical lab tests: ordered and reviewed  Review and summarize past medical records: yes    Patient Progress  Patient progress: stable         Procedures    6:39 PM  Patient has been reassessed and feeling much better. She has tolerated p.o. challenge. Her abdomen is soft and nontender. Labs are reassuring. UA likely contaminant as there are many epithelial cells, 2+ bacteria but no white blood cells and only trace leukocytes. Will send for urine culture. She is already been on 7 days of doxycycline which is the appropriate duration for chlamydia so advised if she is unable to continue she can hold until her GC/chlamydia test results from today's visit and will notify if positive. Sx's are more c/w gastroenteritis versus medication reaction due to abx. DISCHARGE NOTE:  6:41 PM  The patient has been re-evaluated and feeling much better and are stable for discharge. All available radiology and laboratory results have been reviewed with patient and/or available family.   Patient and/or family verbally conveyed their understanding and agreement of the patient's signs, symptoms, diagnosis, treatment and prognosis and additionally agree to follow-up as recommended in the discharge instructions or to return to the Emergency Department should their condition change or worsen prior to their follow-up appointment. All questions have been answered and patient and/or available family express understanding. LABORATORY RESULTS:  Recent Results (from the past 12 hour(s))   CBC WITH AUTOMATED DIFF    Collection Time: 11/03/21  4:21 PM   Result Value Ref Range    WBC 11.7 (H) 3.6 - 11.0 K/uL    RBC 4.51 3.80 - 5.20 M/uL    HGB 12.5 11.5 - 16.0 g/dL    HCT 38.2 35.0 - 47.0 %    MCV 84.7 80.0 - 99.0 FL    MCH 27.7 26.0 - 34.0 PG    MCHC 32.7 30.0 - 36.5 g/dL    RDW 12.6 11.5 - 14.5 %    PLATELET 000 620 - 305 K/uL    MPV 10.1 8.9 - 12.9 FL    NRBC 0.0 0  WBC    ABSOLUTE NRBC 0.00 0.00 - 0.01 K/uL    NEUTROPHILS 72 32 - 75 %    LYMPHOCYTES 17 12 - 49 %    MONOCYTES 6 5 - 13 %    EOSINOPHILS 4 0 - 7 %    BASOPHILS 1 0 - 1 %    IMMATURE GRANULOCYTES 0 0.0 - 0.5 %    ABS. NEUTROPHILS 8.4 (H) 1.8 - 8.0 K/UL    ABS. LYMPHOCYTES 2.0 0.8 - 3.5 K/UL    ABS. MONOCYTES 0.8 0.0 - 1.0 K/UL    ABS. EOSINOPHILS 0.4 0.0 - 0.4 K/UL    ABS. BASOPHILS 0.1 0.0 - 0.1 K/UL    ABS. IMM. GRANS. 0.0 0.00 - 0.04 K/UL    DF AUTOMATED     LIPASE    Collection Time: 11/03/21  4:21 PM   Result Value Ref Range    Lipase 154 73 - 068 U/L   METABOLIC PANEL, COMPREHENSIVE    Collection Time: 11/03/21  4:21 PM   Result Value Ref Range    Sodium 140 136 - 145 mmol/L    Potassium 4.0 3.5 - 5.1 mmol/L    Chloride 101 97 - 108 mmol/L    CO2 28 21 - 32 mmol/L    Anion gap 11 5 - 15 mmol/L    Glucose 90 65 - 100 mg/dL    BUN 15 6 - 20 MG/DL    Creatinine 0.78 0.55 - 1.02 MG/DL    BUN/Creatinine ratio 19 12 - 20      GFR est AA >60 >60 ml/min/1.73m2    GFR est non-AA >60 >60 ml/min/1.73m2    Calcium 9.1 8.5 - 10.1 MG/DL    Bilirubin, total 0.3 0.2 - 1.0 MG/DL    ALT (SGPT) 28 12 - 78 U/L    AST (SGOT) 16 15 - 37 U/L    Alk.  phosphatase 56 45 - 117 U/L    Protein, total 8.0 6.4 - 8.2 g/dL    Albumin 3.8 3.5 - 5.0 g/dL    Globulin 4.2 (H) 2.0 - 4.0 g/dL    A-G Ratio 0.9 (L) 1.1 - 2.2     HCG URINE, QL. - POC    Collection Time: 11/03/21  4:46 PM Result Value Ref Range    Pregnancy test,urine (POC) Negative NEG     URINALYSIS W/MICROSCOPIC    Collection Time: 11/03/21  4:49 PM   Result Value Ref Range    Color YELLOW/STRAW      Appearance HAZY (A) CLEAR      Specific gravity 1.025 1.003 - 1.030      pH (UA) 5.5 5.0 - 8.0      Protein Negative NEG mg/dL    Glucose Negative NEG mg/dL    Ketone TRACE (A) NEG mg/dL    Bilirubin Negative NEG      Blood Negative NEG      Urobilinogen 0.2 0.2 - 1.0 EU/dL    Nitrites Negative NEG      Leukocyte Esterase SMALL (A) NEG      WBC 0-4 0 - 4 /hpf    RBC 0-5 0 - 5 /hpf    Epithelial cells MANY (A) FEW /lpf    Bacteria 2+ (A) NEG /hpf   URINE CULTURE HOLD SAMPLE    Collection Time: 11/03/21  4:49 PM    Specimen: Serum; Urine   Result Value Ref Range    Urine culture hold        Urine on hold in Microbiology dept for 2 days. If unpreserved urine is submitted, it cannot be used for addtional testing after 24 hours, recollection will be required. IMAGING RESULTS:  No results found. MEDICATIONS GIVEN:  Medications   ketorolac (TORADOL) injection 15 mg (15 mg IntraVENous Given 11/3/21 1646)   ondansetron (ZOFRAN) injection 4 mg (4 mg IntraVENous Given 11/3/21 1647)       IMPRESSION:  1. Gastroenteritis, acute        PLAN:  Follow-up Information     Follow up With Specialties Details Why Contact Chan Callahan MD Family Medicine Schedule an appointment as soon as possible for a visit   [address] [place] [address]  [phone]          Current Discharge Medication List      START taking these medications    Details   ondansetron (Zofran ODT) 4 mg disintegrating tablet Take 1 Tablet by mouth every eight (8) hours as needed for Nausea. Qty: 10 Tablet, Refills: 0  Start date: 11/3/2021      famotidine (Pepcid) 20 mg tablet Take 1 Tablet by mouth two (2) times a day.   Qty: 14 Tablet, Refills: 0  Start date: 11/3/2021

## 2021-11-03 NOTE — ED TRIAGE NOTES
Pt arrives with n/v x 3 days and some diarrhea occasionally. Pt is on doxcycline for Chlamydia currently (has taken 7 days completed and 3 days left).

## 2021-11-03 NOTE — LETTER
NOTIFICATION RETURN TO WORK / SCHOOL 
 
11/3/2021 6:45 PM 
 
Ms. Alicia Salamanca [address] [id number] To Whom It May Concern: 
 
Alicia Salamanca is currently under the care of [facility]. She will return to work/school on: 11/6/2021 If there are questions or concerns please have the patient contact our office.  
 
 
 
Sincerely,

## 2021-11-04 ENCOUNTER — PATIENT OUTREACH (OUTPATIENT)
Dept: OTHER | Age: 21
End: 2021-11-04

## 2021-11-04 LAB
BACTERIA SPEC CULT: NORMAL
SERVICE CMNT-IMP: NORMAL

## 2021-11-05 ENCOUNTER — PATIENT OUTREACH (OUTPATIENT)
Dept: OTHER | Age: 21
End: 2021-11-05

## 2021-11-05 NOTE — PROGRESS NOTES
HPRP progress note    Patient eligible for Grec[facility] 994 care management    Received notification of discharge from [facility] ED on 11/3/21 for Gastroenteritis. Contacted patient to discuss post discharge needs and offer care management services. Two patient identifiers verified. Discussed the care management program.  Patient agrees to care management services at this time. PMH:   Past Medical History:   Diagnosis Date    Abdominal pain, other specified site 3/3/2015    Asthma     Premature infant        Social History:   Social History     Socioeconomic History    Marital status: SINGLE     Spouse name: Not on file    Number of children: Not on file    Years of education: Not on file    Highest education level: Not on file   Occupational History    Not on file   Tobacco Use    Smoking status: Never Smoker    Smokeless tobacco: Never Used   Substance and Sexual Activity    Alcohol use: No    Drug use: No    Sexual activity: Never   Other Topics Concern    Not on file   Social History Narrative    Not on file     Social Determinants of Health     Financial Resource Strain:     Difficulty of Paying Living Expenses: Not on file   Food Insecurity:     Worried About Running Out of Food in the Last Year: Not on file    Magno of Food in the Last Year: Not on file   Transportation Needs:     Lack of Transportation (Medical): Not on file    Lack of Transportation (Non-Medical):  Not on file   Physical Activity:     Days of Exercise per Week: Not on file    Minutes of Exercise per Session: Not on file   Stress:     Feeling of Stress : Not on file   Social Connections:     Frequency of Communication with Friends and Family: Not on file    Frequency of Social Gatherings with Friends and Family: Not on file    Attends Bahai Services: Not on file    Active Member of Clubs or Organizations: Not on file    Attends Club or Organization Meetings: Not on file    Marital Status: Not on file Intimate Partner Violence:     Fear of Current or Ex-Partner: Not on file    Emotionally Abused: Not on file    Physically Abused: Not on file    Sexually Abused: Not on file   Housing Stability:     Unable to Pay for Housing in the Last Year: Not on file    Number of [place] in the Last Year: Not on file    Unstable Housing in the Last Year: Not on file         Care management assessment completed:  *Spoke with patient who reports that she is still not feeling but is doing better than when she went to the ED. Patient states that she is able to eat and drink as long as she takes Zofran. Diarrhea is resolving. Urinating without difficulty. Condition Focused Assessment:   Gastrointestinal Condition Focused Assessment    Skin- any open wounds, incisions or appliance no  Description of wound- N/A  New or worsening pain? no  If yes, pain rated 0-10: 0 Location/pain characteristics: N/A   New or worsening numbness or tingling? no  If yes, location of numbness and tingling: N/A  Activity level- moving several times a day, or as recommended? yes  Abnormal activity level reported: N/A   Nutrition- prescribed diet? no   Diet prescribed or recommended: N/A  Difficulty swallowing no  Last weight of 207 lb 0.2 oz lbs on 11/3/21  Last BM on 11/5/21 abnormal consistency or amount no  Abnormal labs no     In the last 24 hour have you experienced; Fever no  Low body temperature no  Chills or shaking no  Sweating no  Fast heart rate no  Fast breathing no  Dizziness/lightheadedness no  Confusion or unusual change in mental status no  Diarrhea yes  Nausea yes   Vomiting no  Shortness of breath or difficulty breathing no  Less urine output no  Cold, clammy, and pale skin no  Skin rash or skin color changes no      Red Flags:  Call 911 anytime you think you may need emergency care. For example, call if:   You vomit blood or what looks like coffee grounds. You passed out (lost consciousness).   You pass maroon or very bloody stools. Call your doctor now or seek immediate medical care if:  You have severe belly pain. You have signs of needing more fluids. You have sunken eyes, a dry mouth, and pass only a  little urine. You feel like you are going to faint. You have increased belly pain that does not go away in 1 to 2 days. You have new or increased nausea, or you are vomiting. You have a new or higher fever. Your stools are black and tarlike or have streaks of blood. You are dizzy or lightheaded. You urinate less than usual, or your urine is dark yellow or brown. Medications:  New Medications at Discharge:   famotidine 20 mg tablet  ondansetron 4 mg disintegrating tablet     Changed Medications at Discharge: None Noted  Discontinued Medications at Discharge: None Noted  Current Outpatient Medications   Medication Sig    ondansetron (Zofran ODT) 4 mg disintegrating tablet Take 1 Tablet by mouth every eight (8) hours as needed for Nausea.  famotidine (Pepcid) 20 mg tablet Take 1 Tablet by mouth two (2) times a day.  mometasone furoate (ASMANEX TWISTHALER IN) Take  by inhalation.  norethindrone-e.estradiol-iron (LO LOESTRIN FE PO) Take  by mouth.  montelukast (SINGULAIR) 10 mg tablet Take 10 mg by mouth daily. No current facility-administered medications for this visit. There are no discontinued medications. Performed medication reconciliation with patient, and patient verbalizes understanding of administration of home medications. There were no barriers to obtaining medications identified at this time.     Preventative Care     Health Maintenance   Topic Date Due    Hepatitis C Screening  Never done    Pap Smear  Never done    Flu Vaccine (1) 09/01/2021    DTaP/Tdap/Td series (8 - Td or Tdap) 08/31/2031    HPV Age 9Y-34Y  Completed    COVID-19 Vaccine  Completed    Pneumococcal 0-64 years  Aged Out         CM Identified  Problems  (Contributing problems for risk for readmission) 1. Potential Learning Needs      Goals:     Initial Plan of Care as discussed and agreed with patient:    Demonstrates no  Red Flag Symptoms  Educated on signs and symptoms to monitor for  § 11/5/21 - Denies any Red Flag Symptoms      Completes all Follow-Up appointments within 30 days of ED visit  Educated on importance of Follow Up for prevention of complications  · 98/6/83 - Patient states that she only went to ED because PCP office referred her to ED because of her symptoms. Will contact PCP office if symptoms do not resolve or worsen. Barriers/Support system:  patient and mother        Barriers/Challenges to Care: []  Decline in memory    []  Language barrier     []  Emotional                  []  Limited mobility  []  Lack of motivation     [] Vision, hearing or cognitive impairment []  Knowledge [] Financial Barriers []  Lack of support  []  Pain []  Other [x]  None    PCP/Specialist follow up: Patient si not scheduled to follow up with Ramon Hurt MD at this time. No future appointments. Reviewed red flags with patient, and patient verbalizes understanding. Patient given an opportunity to ask questions. No other clinical/social/functional needs noted. The patient agrees to contact the PCP office for questions related to their healthcare. The patient expressed thanks, offered no additional questions and ended the call.       Plan for next call: 11/22/21 f/u - GI Symptoms

## 2021-11-06 LAB
C TRACH RRNA SPEC QL NAA+PROBE: POSITIVE
N GONORRHOEA RRNA SPEC QL NAA+PROBE: NEGATIVE
SPECIMEN SOURCE: ABNORMAL

## 2021-11-06 RX ORDER — LEVOFLOXACIN 500 MG/1
500 TABLET, FILM COATED ORAL DAILY
Qty: 7 TABLET | Refills: 0 | Status: SHIPPED | OUTPATIENT
Start: 2021-11-06 | End: 2021-11-13

## 2021-11-06 NOTE — ED NOTES
Patient called back with positive chlamydia test.  Patient is antibiotic at this time. Patient had finished doxycycline at the time of testing and test is still positive. Prescription sent for levofloxacin to her pharmacy based on possible failure of treatment. Patient instructed to follow-up with PCP after Antibiotics for possible retesting if necessary. Valerie Miller

## 2021-11-06 NOTE — ED NOTES
Pt called ED after receiving message of positive test result. ED MD Tang speaking with patient at this time.

## 2021-11-06 NOTE — PROGRESS NOTES
Patient notified of result by ER provider. Patient had just completed doxycycline so Levaquin prescription was sent to pharmacy. See ER provider note for further.

## 2021-11-22 ENCOUNTER — PATIENT OUTREACH (OUTPATIENT)
Dept: OTHER | Age: 21
End: 2021-11-22

## 2021-11-22 NOTE — PROGRESS NOTES
[facility]PRP follow up. Final call made today. Contacted patient for transitions of care services. Verified   and Zip Code for HIPAA security. *Spoke with patient who reports that she is doing much better. Patient is at work. Goals:  Demonstrates no  Red Flag Symptoms  Educated on signs and symptoms to monitor for  § 21 - Denies any Red Flag Symptoms  § 21 - Denies any Red Flag Symptoms      Completes all Follow-Up appointments within 30 days of ED visit  Educated on importance of Follow Up for prevention of complications  § 51 - Patient states that she only went to ED because PCP office referred her to ED      because of her symptoms. Will contact PCP office if symptoms do not resolve or worsen. § 21 - Patient has not Follow up with PCP.      Patient states that she is doing well enough that additional support is not needed at this time. Patient had no other questions or concerns. Contact information provided and reminded her that I can be reached if any needs arise in the future    Resolving current episode 11/3/21(Transitions of care complete). No further ED/UC or hospital admissions within 19 days post discharge. Patient attended follow-up appointments as directed. Patient has met patient stated and/or medical goals. No outreach from patient to [address] [place] [address].

## 2021-12-29 ENCOUNTER — APPOINTMENT (OUTPATIENT)
Dept: GENERAL RADIOLOGY | Age: 21
End: 2021-12-29
Attending: EMERGENCY MEDICINE
Payer: COMMERCIAL

## 2021-12-29 ENCOUNTER — HOSPITAL ENCOUNTER (EMERGENCY)
Age: 21
Discharge: HOME | End: 2021-12-29
Attending: EMERGENCY MEDICINE
Payer: COMMERCIAL

## 2021-12-29 VITALS
DIASTOLIC BLOOD PRESSURE: 72 MMHG | HEART RATE: 72 BPM | TEMPERATURE: 98.8 F | OXYGEN SATURATION: 99 % | WEIGHT: 205 LBS | HEIGHT: 62 IN | RESPIRATION RATE: 17 BRPM | BODY MASS INDEX: 37.73 KG/M2 | SYSTOLIC BLOOD PRESSURE: 145 MMHG

## 2021-12-29 DIAGNOSIS — R00.0 TACHYCARDIA: Primary | ICD-10-CM

## 2021-12-29 LAB
ANION GAP SERPL CALC-SCNC: 4 MMOL/L (ref 5–15)
ATRIAL RATE: 90 BPM
BASOPHILS # BLD: 0.1 K/UL (ref 0–0.1)
BASOPHILS NFR BLD: 1 % (ref 0–1)
BUN SERPL-MCNC: 21 MG/DL (ref 6–20)
BUN/CREAT SERPL: 21 (ref 12–20)
CALCIUM SERPL-MCNC: 9.2 MG/DL (ref 8.5–10.1)
CALCULATED P AXIS, ECG09: 60 DEGREES
CALCULATED R AXIS, ECG10: 20 DEGREES
CALCULATED T AXIS, ECG11: 34 DEGREES
CHLORIDE SERPL-SCNC: 107 MMOL/L (ref 97–108)
CO2 SERPL-SCNC: 26 MMOL/L (ref 21–32)
COMMENT, HOLDF: NORMAL
COVID-19 RAPID TEST, COVR: NOT DETECTED
CREAT SERPL-MCNC: 0.99 MG/DL (ref 0.55–1.02)
D DIMER PPP FEU-MCNC: 0.26 MG/L FEU (ref 0–0.65)
DIAGNOSIS, 93000: NORMAL
DIFFERENTIAL METHOD BLD: ABNORMAL
EOSINOPHIL # BLD: 0.5 K/UL (ref 0–0.4)
EOSINOPHIL NFR BLD: 5 % (ref 0–7)
ERYTHROCYTE [DISTWIDTH] IN BLOOD BY AUTOMATED COUNT: 13.2 % (ref 11.5–14.5)
GLUCOSE SERPL-MCNC: 118 MG/DL (ref 65–100)
HCT VFR BLD AUTO: 35.7 % (ref 35–47)
HGB BLD-MCNC: 11.9 G/DL (ref 11.5–16)
IMM GRANULOCYTES # BLD AUTO: 0 K/UL (ref 0–0.04)
IMM GRANULOCYTES NFR BLD AUTO: 0 % (ref 0–0.5)
LYMPHOCYTES # BLD: 2 K/UL (ref 0.8–3.5)
LYMPHOCYTES NFR BLD: 22 % (ref 12–49)
MAGNESIUM SERPL-MCNC: 2 MG/DL (ref 1.6–2.4)
MCH RBC QN AUTO: 28.2 PG (ref 26–34)
MCHC RBC AUTO-ENTMCNC: 33.3 G/DL (ref 30–36.5)
MCV RBC AUTO: 84.6 FL (ref 80–99)
MONOCYTES # BLD: 0.6 K/UL (ref 0–1)
MONOCYTES NFR BLD: 6 % (ref 5–13)
NEUTS SEG # BLD: 6 K/UL (ref 1.8–8)
NEUTS SEG NFR BLD: 66 % (ref 32–75)
NRBC # BLD: 0 K/UL (ref 0–0.01)
NRBC BLD-RTO: 0 PER 100 WBC
P-R INTERVAL, ECG05: 156 MS
PLATELET # BLD AUTO: 273 K/UL (ref 150–400)
PMV BLD AUTO: 10.2 FL (ref 8.9–12.9)
POTASSIUM SERPL-SCNC: 4.1 MMOL/L (ref 3.5–5.1)
Q-T INTERVAL, ECG07: 346 MS
QRS DURATION, ECG06: 78 MS
QTC CALCULATION (BEZET), ECG08: 423 MS
RBC # BLD AUTO: 4.22 M/UL (ref 3.8–5.2)
SAMPLES BEING HELD,HOLD: NORMAL
SODIUM SERPL-SCNC: 137 MMOL/L (ref 136–145)
SOURCE, COVRS: NORMAL
TROPONIN-HIGH SENSITIVITY: 4 NG/L (ref 0–51)
TSH SERPL DL<=0.05 MIU/L-ACNC: 1.91 UIU/ML (ref 0.36–3.74)
VENTRICULAR RATE, ECG03: 90 BPM
WBC # BLD AUTO: 9.1 K/UL (ref 3.6–11)

## 2021-12-29 PROCEDURE — 80048 BASIC METABOLIC PNL TOTAL CA: CPT

## 2021-12-29 PROCEDURE — 87635 SARS-COV-2 COVID-19 AMP PRB: CPT

## 2021-12-29 PROCEDURE — 84443 ASSAY THYROID STIM HORMONE: CPT

## 2021-12-29 PROCEDURE — 85025 COMPLETE CBC W/AUTO DIFF WBC: CPT

## 2021-12-29 PROCEDURE — 93005 ELECTROCARDIOGRAM TRACING: CPT

## 2021-12-29 PROCEDURE — 99284 EMERGENCY DEPT VISIT MOD MDM: CPT

## 2021-12-29 PROCEDURE — 83735 ASSAY OF MAGNESIUM: CPT

## 2021-12-29 PROCEDURE — 71045 X-RAY EXAM CHEST 1 VIEW: CPT

## 2021-12-29 PROCEDURE — 96360 HYDRATION IV INFUSION INIT: CPT

## 2021-12-29 PROCEDURE — 85379 FIBRIN DEGRADATION QUANT: CPT

## 2021-12-29 PROCEDURE — 96361 HYDRATE IV INFUSION ADD-ON: CPT

## 2021-12-29 PROCEDURE — 84484 ASSAY OF TROPONIN QUANT: CPT

## 2021-12-29 PROCEDURE — 74011250637 HC RX REV CODE- 250/637: Performed by: EMERGENCY MEDICINE

## 2021-12-29 PROCEDURE — 74011250636 HC RX REV CODE- 250/636: Performed by: EMERGENCY MEDICINE

## 2021-12-29 RX ORDER — GUAIFENESIN 100 MG/5ML
324 LIQUID (ML) ORAL
Status: COMPLETED | OUTPATIENT
Start: 2021-12-29 | End: 2021-12-29

## 2021-12-29 RX ADMIN — SODIUM CHLORIDE 1000 ML: 9 INJECTION, SOLUTION INTRAVENOUS at 17:40

## 2021-12-29 RX ADMIN — ASPIRIN 81 MG CHEWABLE TABLET 324 MG: 81 TABLET CHEWABLE at 17:41

## 2021-12-29 NOTE — ED TRIAGE NOTES
Triage: pt arrives ambulatory from work with CC of heart rate in the 130s. She endorses profuse diaphoresis and shortness of breath with activity. Endorses chest pain.

## 2021-12-29 NOTE — ED PROVIDER NOTES
Patient is a 57-year-old with no significant past medical history who comes into the emergency department with palpitations, tachycardia, and diaphoresis. She reports that she has had 3 episodes today where she felt lightheaded and dizzy, got sweaty, and when she checked her apple watch it showed heart rate in the 110s to 130s. The history is provided by the patient. Palpitations   This is a new problem. The current episode started 3 to 5 hours ago. The problem has been resolved. Episode frequency: intermittently. The problem is associated with nothing. Associated symptoms include diaphoresis and chest pressure. Pertinent negatives include no chest pain, no near-syncope, no vomiting, no cough and no shortness of breath. Risk factors include no risk factors. Her past medical history does not include hyperthyroidism, valve disorder, heart disease or SVT. Past Medical History:   Diagnosis Date    Abdominal pain, other specified site 3/3/2015    Asthma     Premature infant        No past surgical history on file.       Family History:   Problem Relation Age of Onset    Hypertension Father     Cancer Maternal Grandmother     Stroke Maternal Grandfather     Hypertension Paternal Grandmother     Hypertension Paternal Grandfather        Social History     Socioeconomic History    Marital status: SINGLE     Spouse name: Not on file    Number of children: Not on file    Years of education: Not on file    Highest education level: Not on file   Occupational History    Not on file   Tobacco Use    Smoking status: Never Smoker    Smokeless tobacco: Never Used   Substance and Sexual Activity    Alcohol use: No    Drug use: No    Sexual activity: Never   Other Topics Concern    Not on file   Social History Narrative    Not on file     Social Determinants of Health     Financial Resource Strain:     Difficulty of Paying Living Expenses: Not on file   Food Insecurity:     Worried About Running Out of Food in the Last Year: Not on file    Ran Out of Food in the Last Year: Not on file   Transportation Needs:     Lack of Transportation (Medical): Not on file    Lack of Transportation (Non-Medical): Not on file   Physical Activity:     Days of Exercise per Week: Not on file    Minutes of Exercise per Session: Not on file   Stress:     Feeling of Stress : Not on file   Social Connections:     Frequency of Communication with Friends and Family: Not on file    Frequency of Social Gatherings with Friends and Family: Not on file    Attends Jain Services: Not on file    Active Member of [address] [place] [address] [employer] or Organizations: Not on file    Attends Club or Organization Meetings: Not on file    Marital Status: Not on file   Intimate Partner Violence:     Fear of Current or Ex-Partner: Not on file    Emotionally Abused: Not on file    Physically Abused: Not on file    Sexually Abused: Not on file   Housing Stability:     Unable to Pay for Housing in the Last Year: Not on file    Number of [place] in the Last Year: Not on file    Unstable Housing in the Last Year: Not on file         ALLERGIES: Tamiflu [oseltamivir]    Review of Systems   Constitutional: Positive for diaphoresis. Respiratory: Negative for cough and shortness of breath. Cardiovascular: Positive for palpitations. Negative for chest pain and near-syncope. Gastrointestinal: Negative for vomiting. All other systems reviewed and are negative. Vitals:    12/29/21 1539   BP: (!) 160/94   Pulse: 87   Resp: 16   Temp: 98.3 °F (36.8 °C)   SpO2: 98%            Physical Exam  Vitals and nursing note reviewed. Constitutional:       Appearance: Normal appearance. She is well-developed. HENT:      Head: Normocephalic and atraumatic. Eyes:      Pupils: Pupils are equal, round, and reactive to light. Cardiovascular:      Rate and Rhythm: Normal rate and regular rhythm.    Pulmonary:      Effort: Pulmonary effort is normal.      Breath sounds: Normal breath sounds. Abdominal:      General: Abdomen is flat. There is no distension. Palpations: Abdomen is soft. Tenderness: There is no abdominal tenderness. Musculoskeletal:      Cervical back: Normal range of motion and neck supple. Skin:     General: Skin is warm and dry. Capillary Refill: Capillary refill takes less than 2 seconds. Neurological:      Mental Status: She is alert and oriented to person, place, and time. Psychiatric:         Mood and Affect: Mood normal.         Behavior: Behavior normal.          MDM       Procedures      The patient is resting comfortably and feels better, is alert, talkative, interactive and in no distress. The repeat examination is unremarkable and benign and she has had no episodes of tachycardia while in the emergency department. The patient is neurologically intact, has a normal mental status and is ambulatory in the ED. The history, exam, diagnostic testing (if any) and the patient's current condition do not suggest significant arrhythmia, STEMI, seizure, sepsis, hyperthyroid, thyroid storm, electrolyte disturbance, or other significant pathology that would warrant further testing, continued ED treatment, admission, neurological consultation, or other specialist evaluation at this point. The vital signs have been stable. The patient's condition is stable and appropriate for discharge. The patient will pursue further outpatient evaluation with the cardiologist as indicated in the discharge instructions.

## 2021-12-30 ENCOUNTER — PATIENT OUTREACH (OUTPATIENT)
Dept: OTHER | Age: 21
End: 2021-12-30

## 2021-12-30 NOTE — PROGRESS NOTES
Initial HPRP:   Patient on report as discharged from [facility] ED Visit 12/29/21 for Tachycardia. Initial attempt to contact patient for transitions of care.  Left discreet message on voicemail with this Care Coordinator's contact information.  Will attempt outreach on 1/3/22.      Call 911 anytime you think you may need emergency care. For example, call if:   You passed out (lost consciousness). You have symptoms of a heart attack. These may include:  Chest pain or pressure, or a strange feeling in the chest.  Sweating. Shortness of breath. Pain, pressure, or a strange feeling in the back, neck, jaw, or upper belly or in one or both  shoulders or arms. Lightheadedness or sudden weakness. A fast or irregular heartbeat. After you call 911, the  may tell you to chew 1 adult-strength or 2 to 4 low-dose aspirin. Wait for an ambulance. Do not try to drive yourself. You have symptoms of a stroke. These may include:  Sudden numbness, tingling, weakness, or loss of movement in your face, arm, or leg,  especially on only one side of your body. Sudden vision changes. Sudden trouble speaking. Sudden confusion or trouble understanding simple statements. Sudden problems with walking or balance. A sudden, severe headache that is different from past headaches    Call your doctor now or seek immediate medical care if:  You have heart palpitations and:  Are dizzy or lightheaded, or you feel like you may faint. Have new or increased shortness of breath. You continue to have heart palpitations.

## 2022-01-03 ENCOUNTER — PATIENT OUTREACH (OUTPATIENT)
Dept: OTHER | Age: 22
End: 2022-01-03

## 2022-01-03 NOTE — PROGRESS NOTES
Patient identified as eligible for [address] [place] [address] services. Second telephone outreach attempted. Left discreet voicemail with this CM confidential contact information. Will send UTR letter via [address] Next Outreach 1/19/21 f/u - ? Cardiology Appointment.

## 2022-01-19 ENCOUNTER — PATIENT OUTREACH (OUTPATIENT)
Dept: OTHER | Age: 22
End: 2022-01-19

## 2022-01-19 NOTE — PROGRESS NOTES
HPRP f/u:  Telephone attempt to contact patient for Health Promotion and Risk Prevention. Left discreet message on voicemail with this CC contact information. Will follow for one month for transitions of care needs. Next outreach is 2/2/22 for discussion f/u - Tachycardia and Resolve Episode.

## 2022-02-01 ENCOUNTER — PATIENT OUTREACH (OUTPATIENT)
Dept: OTHER | Age: 22
End: 2022-02-01

## 2022-02-05 ENCOUNTER — HOSPITAL ENCOUNTER (EMERGENCY)
Age: 22
Discharge: HOME | End: 2022-02-06
Attending: EMERGENCY MEDICINE
Payer: COMMERCIAL

## 2022-02-05 VITALS
BODY MASS INDEX: 37.73 KG/M2 | OXYGEN SATURATION: 98 % | WEIGHT: 205 LBS | HEART RATE: 100 BPM | DIASTOLIC BLOOD PRESSURE: 74 MMHG | SYSTOLIC BLOOD PRESSURE: 148 MMHG | RESPIRATION RATE: 20 BRPM | HEIGHT: 62 IN | TEMPERATURE: 99.3 F

## 2022-02-05 DIAGNOSIS — N76.5 VAGINAL ULCER: Primary | ICD-10-CM

## 2022-02-05 DIAGNOSIS — B96.89 BV (BACTERIAL VAGINOSIS): ICD-10-CM

## 2022-02-05 DIAGNOSIS — N76.0 BV (BACTERIAL VAGINOSIS): ICD-10-CM

## 2022-02-05 LAB
APPEARANCE UR: ABNORMAL
BACTERIA URNS QL MICRO: ABNORMAL /HPF
BILIRUB UR QL: NEGATIVE
CLUE CELLS VAG QL WET PREP: NORMAL
COLOR UR: ABNORMAL
EPITH CASTS URNS QL MICRO: ABNORMAL /LPF
GLUCOSE UR STRIP.AUTO-MCNC: NEGATIVE MG/DL
HCG UR QL: NEGATIVE
HGB UR QL STRIP: ABNORMAL
KETONES UR QL STRIP.AUTO: NEGATIVE MG/DL
KOH PREP SPEC: NORMAL
LEUKOCYTE ESTERASE UR QL STRIP.AUTO: ABNORMAL
NITRITE UR QL STRIP.AUTO: NEGATIVE
PH UR STRIP: 6 [PH] (ref 5–8)
PROT UR STRIP-MCNC: NEGATIVE MG/DL
RBC #/AREA URNS HPF: ABNORMAL /HPF (ref 0–5)
SERVICE CMNT-IMP: NORMAL
SP GR UR REFRACTOMETRY: >1.03 (ref 1–1.03)
T VAGINALIS VAG QL WET PREP: NORMAL
UR CULT HOLD, URHOLD: NORMAL
UROBILINOGEN UR QL STRIP.AUTO: 0.2 EU/DL (ref 0.2–1)
WBC URNS QL MICRO: ABNORMAL /HPF (ref 0–4)

## 2022-02-05 PROCEDURE — 99283 EMERGENCY DEPT VISIT LOW MDM: CPT

## 2022-02-05 PROCEDURE — 87491 CHLMYD TRACH DNA AMP PROBE: CPT

## 2022-02-05 PROCEDURE — 87210 SMEAR WET MOUNT SALINE/INK: CPT

## 2022-02-05 PROCEDURE — 81001 URINALYSIS AUTO W/SCOPE: CPT

## 2022-02-05 PROCEDURE — 81025 URINE PREGNANCY TEST: CPT

## 2022-02-05 PROCEDURE — 87252 VIRUS INOCULATION TISSUE: CPT

## 2022-02-05 RX ORDER — FLUCONAZOLE 150 MG/1
150 TABLET ORAL DAILY
COMMUNITY

## 2022-02-06 PROCEDURE — 87077 CULTURE AEROBIC IDENTIFY: CPT

## 2022-02-06 PROCEDURE — 87086 URINE CULTURE/COLONY COUNT: CPT

## 2022-02-06 RX ORDER — HYDROCODONE BITARTRATE AND ACETAMINOPHEN 5; 325 MG/1; MG/1
1 TABLET ORAL
Qty: 12 TABLET | Refills: 0 | Status: SHIPPED | OUTPATIENT
Start: 2022-02-05 | End: 2022-02-08

## 2022-02-06 RX ORDER — VALACYCLOVIR HYDROCHLORIDE 1 G/1
1000 TABLET, FILM COATED ORAL 2 TIMES DAILY
Qty: 14 TABLET | Refills: 0 | Status: SHIPPED | OUTPATIENT
Start: 2022-02-05 | End: 2022-02-12

## 2022-02-06 RX ORDER — METRONIDAZOLE 500 MG/1
500 TABLET ORAL 2 TIMES DAILY
Qty: 14 TABLET | Refills: 0 | Status: SHIPPED | OUTPATIENT
Start: 2022-02-05 | End: 2022-02-12

## 2022-02-06 NOTE — DISCHARGE INSTRUCTIONS
Return for new or worsening symptoms. Your exam tonight is most consistent with herpes- you had multiple small, tender, shallow ulcers to the labia minora/majora and the vaginal mucosa. You also had clue cells, so we will treat you for BV as well. The herpes test will come back in 4-5 days - we will call you if it is positive. Take ibuprofen, 3-4 tablets every 6-8 hours, for pain. Try to pour or spray warm water while you urinate to decrease discomfort. You may also take a warm bath to ease discomfort.

## 2022-02-06 NOTE — ED TRIAGE NOTES
Patient noticed milky white discharge yesterday. Today patient has had pain with urination and states she is swollen and inflamed. Patient currently taken monistat and fluconazole. No pain in her back. No fevers/chills.

## 2022-02-06 NOTE — ED PROVIDER NOTES
28-year-old female history of asthma presenting to the ED for vaginal discomfort. Patient reports that she started yesterday with some vaginal discomfort and some \"thick white discharge\" with pruritus, thought that she may have a yeast infection. Patient had a telehealth appointment this morning but notes that over the course of the day she has had worsening external pain, swelling. Notes that it hurts on the outside when she urinates. No pelvic/abdominal pain. No back or flank pain. No fever. Denies concern for STI but is agreeable to being tested. No other concerns. LMP: 1/16. 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 Non-smoker. Occasional alcohol and cannabis. Past Medical History:   Diagnosis Date    Abdominal pain, other specified site 3/3/2015    Asthma     Bacterial vaginosis     Chlamydia     Premature infant     Yeast infection        History reviewed. No pertinent surgical history.       Family History:   Problem Relation Age of Onset    Hypertension Father     Cancer Maternal Grandmother     Stroke Maternal Grandfather     Hypertension Paternal Grandmother     Hypertension Paternal Grandfather        Social History     Socioeconomic History    Marital status: SINGLE     Spouse name: Not on file    Number of children: Not on file    Years of education: Not on file    Highest education level: Not on file   Occupational History    Not on file   Tobacco Use    Smoking status: Never Smoker    Smokeless tobacco: Never Used   Substance and Sexual Activity    Alcohol use: Yes     Comment: socailly     Drug use: Yes     Types: Marijuana    Sexual activity: Never   Other Topics Concern    Not on file   Social History Narrative    Not on file     Social Determinants of Health     Financial Resource Strain:     Difficulty of Paying Living Expenses: Not on file   Food Insecurity:     Worried About Running Out of Food in the Last Year: Not on file    Magno of Food in the Last Year: Not on file   Transportation Needs:     Lack of Transportation (Medical): Not on file    Lack of Transportation (Non-Medical): Not on file   Physical Activity:     Days of Exercise per Week: Not on file    Minutes of Exercise per Session: Not on file   Stress:     Feeling of Stress : Not on file   Social Connections:     Frequency of Communication with Friends and Family: Not on file    Frequency of Social Gatherings with Friends and Family: Not on file    Attends Latter-day Services: Not on file    Active Member of Clubs or Organizations: Not on file    Attends Club or Organization Meetings: Not on file    Marital Status: Not on file   Intimate Partner Violence:     Fear of Current or Ex-Partner: Not on file    Emotionally Abused: Not on file    Physically Abused: Not on file    Sexually Abused: Not on file   Housing Stability:     Unable to Pay for Housing in the Last Year: Not on file    Number of [place] in the Last Year: Not on file    Unstable Housing in the Last Year: Not on file         ALLERGIES: Tamiflu [oseltamivir]    Review of Systems   Constitutional: Negative for fever. HENT: Negative for facial swelling. Respiratory: Negative for shortness of breath. Cardiovascular: Negative for chest pain. Gastrointestinal: Negative for vomiting. Genitourinary: Positive for dysuria, vaginal discharge and vaginal pain. Skin: Negative for wound. Neurological: Negative for syncope. All other systems reviewed and are negative. Vitals:    02/05/22 2259   BP: (!) 148/74   Pulse: 100   Resp: 20   Temp: 99.3 °F (37.4 °C)   SpO2: 98%   Weight: 93 kg (205 lb)   Height: 5' 2\" (1.575 m)            Physical Exam  Vitals and nursing note reviewed. Constitutional:       Appearance: She is well-developed.       Comments: Pleasant, well-appearing, no distress   HENT:      Head: Normocephalic. Eyes:      Conjunctiva/sclera: Conjunctivae normal.   Cardiovascular:      Rate and Rhythm: Normal rate. Pulmonary:      Effort: Pulmonary effort is normal. No respiratory distress. Genitourinary:     Comments: Multiple small, shallow, exquisitely tender ulcers to the labia majora, labia minora, and the introitus  + Inguinal lymphadenopathy  Musculoskeletal:         General: Normal range of motion. Cervical back: Neck supple. Skin:     General: Skin is warm and dry. Neurological:      Mental Status: She is alert and oriented to person, place, and time. MDM  Number of Diagnoses or Management Options  BV (bacterial vaginosis)  Vaginal ulcer  Diagnosis management comments: 78-year-old female presenting to the ED for vaginal pain, multiple small, shallow ulcerations on exam.  Patient also with low-grade temp, and inguinal lymph nodes, discussed with patient that exam and symptoms are most consistent with herpes, will start empirically on Valtrex, viral culture pending. Patient also with clue cells, will treat.        Amount and/or Complexity of Data Reviewed  Clinical lab tests: ordered and reviewed  Discuss the patient with other providers: yes (Dr. Ivan Baeza ED attending)           Procedures

## 2022-02-07 ENCOUNTER — PATIENT OUTREACH (OUTPATIENT)
Dept: OTHER | Age: 22
End: 2022-02-07

## 2022-02-07 LAB
BACTERIA SPEC CULT: ABNORMAL
C TRACH RRNA SPEC QL NAA+PROBE: NEGATIVE
CC UR VC: ABNORMAL
N GONORRHOEA RRNA SPEC QL NAA+PROBE: NEGATIVE
SERVICE CMNT-IMP: ABNORMAL
SPECIMEN SOURCE: NORMAL

## 2022-02-07 RX ORDER — AMOXICILLIN 875 MG/1
875 TABLET, FILM COATED ORAL 2 TIMES DAILY
Qty: 20 TABLET | Refills: 0 | Status: SHIPPED | OUTPATIENT
Start: 2022-02-07 | End: 2022-02-17

## 2022-02-07 NOTE — PROGRESS NOTES
Pt returned phone call regarding urine culture results. I spoke to her on the phone about the results and sent a new prescription for Amoxicillin to her preferred pharmacy.

## 2022-02-07 NOTE — PROGRESS NOTES
Patient on report as eligible for Case Management. Left discreet message on voicemail with this CM contact information. Will attempt to contact again to offer [phone] Management services.

## 2022-02-08 ENCOUNTER — PATIENT OUTREACH (OUTPATIENT)
Dept: OTHER | Age: 22
End: 2022-02-08

## 2022-02-08 NOTE — LETTER
Ms. Leopoldo Gallery [place] [address] [address]    Dear Ms. [place]rasburg,  My name is Kathia Stafford LPN, Associate Care Manager for [facility] and I have been trying to reach you. The Associate Care Management (ACM) program is a free-of-charge confidential service provided to our associates and their family members covered by the [facility]. The program will provide an associate and his/her family with the [facility] expertise to assist in navigating the health care delivery system, provider services, and their overall care needsso as to assure and improve health care interactions and enhance the quality of life. This program is designed to provide you with the opportunity to have a [employer] partner with you for the following services:     1) when you come home from the hospital or emergency room   2) when help is needed to manage your disease   3) when you need assistance coordinating services or appointments  4) when you need additional education, resources or assistance reaching your Be Well Health Program goals/requirements such as Be Well With Diabetes      [facility] is dedicated to empowering the good health of its community and improving the quality of care and care experiences for associates and their families. We are committed to safeguarding patient confidentiality and privacy, assuring that every associate has the respect he or she deserves in managing their health. The information shared with your care manager will not be shared with anyone else aside from those you identify as part of your care team, and will only be used to assist you with any identified care needs. Please contact me if you would like this service provided to you.      Sincerely,  Kathia Stafford LPN  [address]  [address] [address] [phone]  [email or web address]  [email or web address]. [email or web address]

## 2022-02-08 NOTE — PROGRESS NOTES
Patient identified as eligible for [address] [place] [address] services. Second telephone outreach attempted. Left discreet voicemail with this CM confidential contact information. Will send UTR letter.

## 2022-03-10 ENCOUNTER — PATIENT OUTREACH (OUTPATIENT)
Dept: OTHER | Age: 22
End: 2022-03-10

## 2022-10-04 NOTE — ED TRIAGE NOTES
Triage: patient was in the [facility] last week and was tubing, the tube flipped over and patient's knee was caught and hit a rock. Reports an abrasion to LEFT knee, it has felt sore since incident, but now there is \"yellow drainage\". No known fevers. Seen at [facility], x-ray completed and told \"the bones were in tact but referred here for for further evaluation and possible IV antibiotics\". Patient would prefer oral antibiotics if possible.  Advile 400mg taken at 4pm.  

Walking

## 2023-09-05 ENCOUNTER — HOSPITAL ENCOUNTER (EMERGENCY)
Facility: HOSPITAL | Age: 23
Discharge: HOME | End: 2023-09-05
Attending: EMERGENCY MEDICINE
Payer: COMMERCIAL

## 2023-09-05 VITALS
SYSTOLIC BLOOD PRESSURE: 132 MMHG | BODY MASS INDEX: 38.73 KG/M2 | HEIGHT: 61 IN | HEART RATE: 109 BPM | DIASTOLIC BLOOD PRESSURE: 89 MMHG | RESPIRATION RATE: 30 BRPM | TEMPERATURE: 98.3 F | OXYGEN SATURATION: 98 %

## 2023-09-05 DIAGNOSIS — J45.901 MODERATE ASTHMA WITH EXACERBATION, UNSPECIFIED WHETHER PERSISTENT: Primary | ICD-10-CM

## 2023-09-05 PROCEDURE — 6370000000 HC RX 637 (ALT 250 FOR IP): Performed by: EMERGENCY MEDICINE

## 2023-09-05 PROCEDURE — 6360000002 HC RX W HCPCS: Performed by: EMERGENCY MEDICINE

## 2023-09-05 PROCEDURE — 99283 EMERGENCY DEPT VISIT LOW MDM: CPT

## 2023-09-05 RX ORDER — ALBUTEROL SULFATE 90 UG/1
2 AEROSOL, METERED RESPIRATORY (INHALATION) EVERY 6 HOURS PRN
Qty: 18 G | Refills: 3 | Status: SHIPPED | OUTPATIENT
Start: 2023-09-05

## 2023-09-05 RX ORDER — PREDNISONE 20 MG/1
20 TABLET ORAL DAILY
Qty: 5 TABLET | Refills: 0 | Status: SHIPPED | OUTPATIENT
Start: 2023-09-05 | End: 2023-09-10

## 2023-09-05 RX ORDER — CETIRIZINE HYDROCHLORIDE 10 MG/1
10 TABLET ORAL
Status: COMPLETED | OUTPATIENT
Start: 2023-09-05 | End: 2023-09-05

## 2023-09-05 RX ORDER — IPRATROPIUM BROMIDE AND ALBUTEROL SULFATE 2.5; .5 MG/3ML; MG/3ML
1 SOLUTION RESPIRATORY (INHALATION)
Status: COMPLETED | OUTPATIENT
Start: 2023-09-05 | End: 2023-09-05

## 2023-09-05 RX ORDER — CETIRIZINE HYDROCHLORIDE 10 MG/1
10 TABLET ORAL DAILY
Qty: 30 TABLET | Refills: 0 | Status: SHIPPED | OUTPATIENT
Start: 2023-09-05 | End: 2023-10-05

## 2023-09-05 RX ORDER — FAMOTIDINE 20 MG/1
20 TABLET, FILM COATED ORAL
Status: COMPLETED | OUTPATIENT
Start: 2023-09-05 | End: 2023-09-05

## 2023-09-05 RX ORDER — DEXAMETHASONE SODIUM PHOSPHATE 10 MG/ML
10 INJECTION, SOLUTION INTRAMUSCULAR; INTRAVENOUS
Status: COMPLETED | OUTPATIENT
Start: 2023-09-05 | End: 2023-09-05

## 2023-09-05 RX ORDER — ONDANSETRON 4 MG/1
4 TABLET, ORALLY DISINTEGRATING ORAL
Status: COMPLETED | OUTPATIENT
Start: 2023-09-05 | End: 2023-09-05

## 2023-09-05 RX ADMIN — CETIRIZINE HYDROCHLORIDE 10 MG: 10 TABLET, FILM COATED ORAL at 09:58

## 2023-09-05 RX ADMIN — IPRATROPIUM BROMIDE AND ALBUTEROL SULFATE 1 DOSE: 2.5; .5 SOLUTION RESPIRATORY (INHALATION) at 09:57

## 2023-09-05 RX ADMIN — DEXAMETHASONE SODIUM PHOSPHATE 10 MG: 10 INJECTION INTRAMUSCULAR; INTRAVENOUS at 09:59

## 2023-09-05 RX ADMIN — ONDANSETRON 4 MG: 4 TABLET, ORALLY DISINTEGRATING ORAL at 10:01

## 2023-09-05 RX ADMIN — IPRATROPIUM BROMIDE AND ALBUTEROL SULFATE 1 DOSE: 2.5; .5 SOLUTION RESPIRATORY (INHALATION) at 10:26

## 2023-09-05 RX ADMIN — FAMOTIDINE 20 MG: 20 TABLET ORAL at 09:57

## 2023-09-05 ASSESSMENT — ENCOUNTER SYMPTOMS
COUGH: 1
WHEEZING: 0
SHORTNESS OF BREATH: 1
ABDOMINAL PAIN: 0
TROUBLE SWALLOWING: 0

## 2023-09-05 ASSESSMENT — PAIN - FUNCTIONAL ASSESSMENT: PAIN_FUNCTIONAL_ASSESSMENT: NONE - DENIES PAIN

## 2023-09-05 NOTE — ED TRIAGE NOTES
Patient started with shortness of breath today. Patient left rescue inhaler at home today and ran out of allergy medicine.

## 2024-04-05 ENCOUNTER — OFFICE VISIT (OUTPATIENT)
Age: 24
End: 2024-04-05

## 2024-04-05 VITALS
HEART RATE: 90 BPM | OXYGEN SATURATION: 97 % | WEIGHT: 216 LBS | BODY MASS INDEX: 40.81 KG/M2 | TEMPERATURE: 98.3 F | SYSTOLIC BLOOD PRESSURE: 131 MMHG | DIASTOLIC BLOOD PRESSURE: 85 MMHG | RESPIRATION RATE: 18 BRPM

## 2024-04-05 DIAGNOSIS — J45.20 MILD INTERMITTENT ASTHMATIC BRONCHITIS WITHOUT COMPLICATION: Primary | ICD-10-CM

## 2024-04-05 DIAGNOSIS — R05.1 ACUTE COUGH: ICD-10-CM

## 2024-04-05 LAB
Lab: NORMAL
PERFORMING INSTRUMENT: NORMAL
QC PASS/FAIL: NORMAL
SARS-COV-2, POC: NORMAL

## 2024-04-05 RX ORDER — BENZONATATE 200 MG/1
200 CAPSULE ORAL 3 TIMES DAILY PRN
Qty: 21 CAPSULE | Refills: 0 | Status: SHIPPED | OUTPATIENT
Start: 2024-04-05 | End: 2024-04-12

## 2024-04-05 RX ORDER — PREDNISONE 20 MG/1
40 TABLET ORAL DAILY
Qty: 10 TABLET | Refills: 0 | Status: SHIPPED | OUTPATIENT
Start: 2024-04-05 | End: 2024-04-10

## 2024-04-05 RX ORDER — DESOGESTREL AND ETHINYL ESTRADIOL 0.15-0.03
1 KIT ORAL DAILY
COMMUNITY
Start: 2023-02-17

## 2024-04-05 RX ORDER — BUSPIRONE HYDROCHLORIDE 10 MG/1
10 TABLET ORAL 2 TIMES DAILY
COMMUNITY
Start: 2023-02-16

## 2024-04-05 NOTE — PATIENT INSTRUCTIONS
Symptoms consistent with acute bronchitis  Your vital signs are stable, physical exam is benign, I do not suspect any bacterial involvement at this time  Covid test is negative  Chest xray is negative for infection  Continue to use your Albuterol inhaler every 4-6 hours as needed  Benzonatate up to 3x daily for cough  Prednisone 40mg x5 days  Lots of fluids, plenty of rest  Hot tea with honey, throat lozenges  Follow up with PCP if symptoms persist or worsen  Go to ED if you develop any shortness of breath, chest pain or if you are unable to tolerate food or fluids

## 2024-04-05 NOTE — PROGRESS NOTES
Christine De Oliveira (:  2000) is a 23 y.o. female,New patient, here for evaluation of the following chief complaint(s):  URI (C/o uri. Sx consist of sore throat, cough, mucus and loss of voice.)      ASSESSMENT/PLAN:  Visit Diagnoses and Associated Orders       Mild intermittent asthmatic bronchitis without complication    -  Primary    predniSONE (DELTASONE) 20 MG tablet [6496]      benzonatate (TESSALON) 200 MG capsule [88448]           Acute cough        XR CHEST PA/LAT [69329 CPT(R)]   - Future Order    POCT COVID-19, Antigen [RPD873 Custom]           ORDERS WITHOUT AN ASSOCIATED DIAGNOSIS    busPIRone (BUSPAR) 10 MG tablet [9323]      desogestrel-ethinyl estradiol (APRI) 0.15-30 MG-MCG per tablet [41555]            Symptoms consistent with acute bronchitis  Your vital signs are stable, physical exam is benign, I do not suspect any bacterial involvement at this time  Covid test is negative  Chest xray is negative for infection  Continue to use your Albuterol inhaler every 4-6 hours as needed  Benzonatate up to 3x daily for cough  Prednisone 40mg x5 days  Lots of fluids, plenty of rest  Hot tea with honey, throat lozenges  Follow up with PCP if symptoms persist or worsen  Go to ED if you develop any shortness of breath, chest pain or if you are unable to tolerate food or fluids      SUBJECTIVE/OBJECTIVE:    URI          23 y.o. female presents with symptoms of cough and chest congestion worsening for the past 4 days. Her mother was seen last week and treated for a bilateral pneumonia. The patient denies fevers or chills but does reports some general body aches which she associates with her intense coughing, as well as some general fatigue and malaise. Denies ear pain, reports nasal congestion, sinus pressure and congestion, hoarseness, post nasal drip and sore throat. She has a history of asthma and is using daily maintenance and rescue inhalers. Denies wheezing or shortness of breath. No chest or

## 2024-05-09 ENCOUNTER — OFFICE VISIT (OUTPATIENT)
Age: 24
End: 2024-05-09

## 2024-05-09 VITALS
SYSTOLIC BLOOD PRESSURE: 122 MMHG | WEIGHT: 218.92 LBS | HEIGHT: 63 IN | TEMPERATURE: 97.8 F | BODY MASS INDEX: 38.79 KG/M2 | HEART RATE: 87 BPM | OXYGEN SATURATION: 97 % | DIASTOLIC BLOOD PRESSURE: 70 MMHG

## 2024-05-09 DIAGNOSIS — J06.9 VIRAL UPPER RESPIRATORY ILLNESS: Primary | ICD-10-CM

## 2024-05-09 DIAGNOSIS — J02.9 SORE THROAT: ICD-10-CM

## 2024-05-09 LAB
GROUP A STREP ANTIGEN, POC: NEGATIVE
VALID INTERNAL CONTROL, POC: NORMAL

## 2024-05-09 RX ORDER — FLUOXETINE 10 MG/1
10 CAPSULE ORAL DAILY
COMMUNITY

## 2024-05-09 NOTE — PATIENT INSTRUCTIONS
Negative strep test today  Your vital signs are stable, physical exam is benign, I have a low suspicion for bacterial infection at this time  Suspect viral pharyngitis  I would recommend continuing Mucinex fast max as needed for symptoms  It would also be a good idea to take a good antihistamine (zyrtec/claritin) as needed for the drainage  Afrin 1-2x daily for up to 3 days for the congestion  Warm salt water gargles, hot tea with honey, saline sinus rinses as needed for sore throat  Please monitor your symptoms and follow up if symptoms persist or worsen

## 2024-05-09 NOTE — PROGRESS NOTES
Christine De Oliveira (:  2000) is a 23 y.o. female,Established patient, here for evaluation of the following chief complaint(s):  Pharyngitis (Nasal drainage with facial pressure; onset of sxs yesterday)      Assessment & Plan :  Visit Diagnoses and Associated Orders       Viral upper respiratory illness    -  Primary         Sore throat        AMB POC RAPID STREP A [97425 CPT(R)]           ORDERS WITHOUT AN ASSOCIATED DIAGNOSIS    FLUoxetine (PROZAC) 10 MG capsule [38413]              Negative strep test today  Your vital signs are stable, physical exam is benign, I have a low suspicion for bacterial infection at this time  No evidence of peritonsillar abscess or other complications  Suspect viral pharyngitis  I would recommend continuing Mucinex fast max as needed for symptoms  It would also be a good idea to take a good antihistamine (zyrtec/claritin) as needed for the drainage  Afrin 1-2x daily for up to 3 days for the congestion  Warm salt water gargles, hot tea with honey, saline sinus rinses as needed for sore throat  Please monitor your symptoms and follow up if symptoms persist or worsen       Subjective :    Pharyngitis       23 y.o. female presents with symptoms of sore throat, nasal congestion, bilateral ear fullness that began just yesterday. Denies fevers, chills or body aches. No cough, chest congestion, shortness of breath or wheezing. No chest or abdominal pain. No nausea, vomiting or diarrhea. Taking combination cold/flu medication without relief. 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 Patient had COVID 3 weeks ago and fully recovered from this.         Vitals:    24 1925 24   BP: 128/84 122/70   Site: Left Upper Arm Right Upper Arm   Position: Sitting Sitting   Cuff Size: Large Adult Large Adult   Pulse: 87    Temp: 97.8 °F (36.6 °C)    TempSrc: Oral    SpO2: 97%    Weight: 99.3 kg (218 lb 14.7 oz)    Height: 1.6 m (5' 3\")        Results for

## 2025-02-18 ENCOUNTER — TRANSCRIBE ORDERS (OUTPATIENT)
Facility: HOSPITAL | Age: 25
End: 2025-02-18

## 2025-02-18 DIAGNOSIS — I26.99 PE (PULMONARY THROMBOEMBOLISM) (HCC): Primary | ICD-10-CM
